# Patient Record
Sex: MALE | Race: WHITE | NOT HISPANIC OR LATINO | Employment: OTHER | URBAN - METROPOLITAN AREA
[De-identification: names, ages, dates, MRNs, and addresses within clinical notes are randomized per-mention and may not be internally consistent; named-entity substitution may affect disease eponyms.]

---

## 2023-05-16 ENCOUNTER — OFFICE VISIT (OUTPATIENT)
Dept: FAMILY MEDICINE CLINIC | Facility: CLINIC | Age: 83
End: 2023-05-16

## 2023-05-16 VITALS
HEIGHT: 67 IN | TEMPERATURE: 98.3 F | DIASTOLIC BLOOD PRESSURE: 90 MMHG | SYSTOLIC BLOOD PRESSURE: 180 MMHG | WEIGHT: 189 LBS | BODY MASS INDEX: 29.66 KG/M2 | OXYGEN SATURATION: 98 % | HEART RATE: 72 BPM

## 2023-05-16 DIAGNOSIS — M10.9 GOUT, UNSPECIFIED CAUSE, UNSPECIFIED CHRONICITY, UNSPECIFIED SITE: ICD-10-CM

## 2023-05-16 DIAGNOSIS — R79.89 ELEVATED TSH: ICD-10-CM

## 2023-05-16 DIAGNOSIS — I10 PRIMARY HYPERTENSION: Primary | ICD-10-CM

## 2023-05-16 DIAGNOSIS — Z13.220 SCREENING FOR LIPID DISORDERS: ICD-10-CM

## 2023-05-16 DIAGNOSIS — Z00.00 MEDICARE ANNUAL WELLNESS VISIT, INITIAL: ICD-10-CM

## 2023-05-16 DIAGNOSIS — M25.471 RIGHT ANKLE SWELLING: ICD-10-CM

## 2023-05-16 DIAGNOSIS — N18.31 STAGE 3A CHRONIC KIDNEY DISEASE (HCC): ICD-10-CM

## 2023-05-16 RX ORDER — LANOLIN ALCOHOL/MO/W.PET/CERES
CREAM (GRAM) TOPICAL DAILY
COMMUNITY

## 2023-05-16 RX ORDER — CHOLECALCIFEROL (VITAMIN D3) 1250 MCG
CAPSULE ORAL
COMMUNITY

## 2023-05-16 RX ORDER — BIMATOPROST 0.3 MG/ML
SOLUTION/ DROPS OPHTHALMIC
COMMUNITY
Start: 2023-04-11

## 2023-05-16 NOTE — PATIENT INSTRUCTIONS
Medicare Preventive Visit Patient Instructions  Thank you for completing your Welcome to Medicare Visit or Medicare Annual Wellness Visit today  Your next wellness visit will be due in one year (5/16/2024)  The screening/preventive services that you may require over the next 5-10 years are detailed below  Some tests may not apply to you based off risk factors and/or age  Screening tests ordered at today's visit but not completed yet may show as past due  Also, please note that scanned in results may not display below  Preventive Screenings:  Service Recommendations Previous Testing/Comments   Colorectal Cancer Screening  · Colonoscopy    · Fecal Occult Blood Test (FOBT)/Fecal Immunochemical Test (FIT)  · Fecal DNA/Cologuard Test  · Flexible Sigmoidoscopy Age: 39-70 years old   Colonoscopy: every 10 years (May be performed more frequently if at higher risk)  OR  FOBT/FIT: every 1 year  OR  Cologuard: every 3 years  OR  Sigmoidoscopy: every 5 years  Screening may be recommended earlier than age 39 if at higher risk for colorectal cancer  Also, an individualized decision between you and your healthcare provider will decide whether screening between the ages of 74-80 would be appropriate   Colonoscopy: Not on file  FOBT/FIT: Not on file  Cologuard: Not on file  Sigmoidoscopy: Not on file          Prostate Cancer Screening Individualized decision between patient and health care provider in men between ages of 53-78   Medicare will cover every 12 months beginning on the day after your 50th birthday PSA: No results in last 5 years     Screening Not Indicated     Hepatitis C Screening Once for adults born between [facility]  More frequently in patients at high risk for Hepatitis C Hep C Antibody: Not on file        Diabetes Screening 1-2 times per year if you're at risk for diabetes or have pre-diabetes Fasting glucose: No results in last 5 years (No results in last 5 years)  A1C: 5 4 % (6/30/2018)      Cholesterol Screening Once every 5 years if you don't have a lipid disorder  May order more often based on risk factors  Lipid panel: 07/10/2021  Screening Current      Other Preventive Screenings Covered by Medicare:  1  Abdominal Aortic Aneurysm (AAA) Screening: covered once if your at risk  You're considered to be at risk if you have a family history of AAA or a male between the age of 73-68 who smoking at least 100 cigarettes in your lifetime  2  Lung Cancer Screening: covers low dose CT scan once per year if you meet all of the following conditions: (1) Age 50-69; (2) No signs or symptoms of lung cancer; (3) Current smoker or have quit smoking within the last 15 years; (4) You have a tobacco smoking history of at least 20 pack years (packs per day x number of years you smoked); (5) You get a written order from a healthcare provider  3  Glaucoma Screening: covered annually if you're considered high risk: (1) You have diabetes OR (2) Family history of glaucoma OR (3)  aged 48 and older OR (3)  American aged 72 and older  3  Osteoporosis Screening: covered every 2 years if you meet one of the following conditions: (1) Have a vertebral abnormality; (2) On glucocorticoid therapy for more than 3 months; (3) Have primary hyperparathyroidism; (4) On osteoporosis medications and need to assess response to drug therapy  5  HIV Screening: covered annually if you're between the age of 12-76  Also covered annually if you are younger than 13 and older than 72 with risk factors for HIV infection  For pregnant patients, it is covered up to 3 times per pregnancy      Immunizations:  Immunization Recommendations   Influenza Vaccine Annual influenza vaccination during flu season is recommended for all persons aged >= 6 months who do not have contraindications   Pneumococcal Vaccine   * Pneumococcal conjugate vaccine = PCV13 (Prevnar 13), PCV15 (Vaxneuvance), PCV20 (Prevnar 20)  * Pneumococcal polysaccharide vaccine = PPSV23 (Pneumovax) Adults 2364 years old: 1-3 doses may be recommended based on certain risk factors  Adults 72 years old: 1-2 doses may be recommended based off what pneumonia vaccine you previously received   Hepatitis B Vaccine 3 dose series if at intermediate or high risk (ex: diabetes, end stage renal disease, liver disease)   Tetanus (Td) Vaccine - COST NOT COVERED BY MEDICARE PART B Following completion of primary series, a booster dose should be given every 10 years to maintain immunity against tetanus  Td may also be given as tetanus wound prophylaxis  Tdap Vaccine - COST NOT COVERED BY MEDICARE PART B Recommended at least once for all adults  For pregnant patients, recommended with each pregnancy  Shingles Vaccine (Shingrix) - COST NOT COVERED BY MEDICARE PART B  2 shot series recommended in those aged 48 and above     Health Maintenance Due:  There are no preventive care reminders to display for this patient  Immunizations Due:      Topic Date Due   • COVID-19 Vaccine (1) Never done   • Pneumococcal Vaccine: 65+ Years (1 - PCV) Never done     Advance Directives   What are advance directives? Advance directives are legal documents that state your wishes and plans for medical care  These plans are made ahead of time in case you lose your ability to make decisions for yourself  Advance directives can apply to any medical decision, such as the treatments you want, and if you want to donate organs  What are the types of advance directives? There are many types of advance directives, and each state has rules about how to use them  You may choose a combination of any of the following:  · Living will: This is a written record of the treatment you want  You can also choose which treatments you do not want, which to limit, and which to stop at a certain time  This includes surgery, medicine, IV fluid, and tube feedings  · Durable power of  for healthcare [facility]):   This is a written record that states who you want to make healthcare choices for you when you are unable to make them for yourself  This person, called a proxy, is usually a family member or a friend  You may choose more than 1 proxy  · Do not resuscitate (DNR) order:  A DNR order is used in case your heart stops beating or you stop breathing  It is a request not to have certain forms of treatment, such as CPR  A DNR order may be included in other types of advance directives  · Medical directive: This covers the care that you want if you are in a coma, near death, or unable to make decisions for yourself  You can list the treatments you want for each condition  Treatment may include pain medicine, surgery, blood transfusions, dialysis, IV or tube feedings, and a ventilator (breathing machine)  · Values history: This document has questions about your views, beliefs, and how you feel and think about life  This information can help others choose the care that you would choose  Why are advance directives important? An advance directive helps you control your care  Although spoken wishes may be used, it is better to have your wishes written down  Spoken wishes can be misunderstood, or not followed  Treatments may be given even if you do not want them  An advance directive may make it easier for your family to make difficult choices about your care  Weight Management   Why it is important to manage your weight:  Being overweight increases your risk of health conditions such as heart disease, high blood pressure, type 2 diabetes, and certain types of cancer  It can also increase your risk for osteoarthritis, sleep apnea, and other respiratory problems  Aim for a slow, steady weight loss  Even a small amount of weight loss can lower your risk of health problems  How to lose weight safely:  A safe and healthy way to lose weight is to eat fewer calories and get regular exercise   You can lose up about 1 pound a week by decreasing the number of calories you eat by 500 calories each day  Healthy meal plan for weight management:  A healthy meal plan includes a variety of foods, contains fewer calories, and helps you stay healthy  A healthy meal plan includes the following:  · Eat whole-grain foods more often  A healthy meal plan should contain fiber  Fiber is the part of grains, fruits, and vegetables that is not broken down by your body  Whole-grain foods are healthy and provide extra fiber in your diet  Some examples of whole-grain foods are whole-wheat breads and pastas, oatmeal, brown rice, and bulgur  · Eat a variety of vegetables every day  Include dark, leafy greens such as spinach, kale, aaliyah greens, and mustard greens  Eat yellow and orange vegetables such as carrots, sweet potatoes, and winter squash  · Eat a variety of fruits every day  Choose fresh or canned fruit (canned in its own juice or light syrup) instead of juice  Fruit juice has very little or no fiber  · Eat low-fat dairy foods  Drink fat-free (skim) milk or 1% milk  Eat fat-free yogurt and low-fat cottage cheese  Try low-fat cheeses such as mozzarella and other reduced-fat cheeses  · Choose meat and other protein foods that are low in fat  Choose beans or other legumes such as split peas or lentils  Choose fish, skinless poultry (chicken or turkey), or lean cuts of red meat (beef or pork)  Before you cook meat or poultry, cut off any visible fat  · Use less fat and oil  Try baking foods instead of frying them  Add less fat, such as margarine, sour cream, regular salad dressing and mayonnaise to foods  Eat fewer high-fat foods  Some examples of high-fat foods include french fries, doughnuts, ice cream, and cakes  · Eat fewer sweets  Limit foods and drinks that are high in sugar  This includes candy, cookies, regular soda, and sweetened drinks  Exercise:  Exercise at least 30 minutes per day on most days of the week   Some examples of exercise include walking, biking, dancing, and swimming  You can also fit in more physical activity by taking the stairs instead of the elevator or parking farther away from stores  Ask your healthcare provider about the best exercise plan for you  © Copyright [employer] 2018 Information is for End User's use only and may not be sold, redistributed or otherwise used for commercial purposes   All illustrations and images included in CareNotes® are the copyrighted property of [employer]  or [address] [place], [place] [address]pe

## 2023-05-16 NOTE — PROGRESS NOTES
Assessment and Plan:     Problem List Items Addressed This Visit    None  Visit Diagnoses     Primary hypertension    -  Primary    Stage 3a chronic kidney disease ([facility] [address] )        Relevant Orders    Comprehensive metabolic panel    CBC and differential    Elevated TSH        Relevant Orders    TSH, 3rd generation with Free T4 reflex    Screening for lipid disorders        Relevant Orders    Lipid Panel with Direct LDL reflex    Gout, unspecified cause, unspecified chronicity, unspecified site        Relevant Orders    Uric acid    Right ankle swelling        Relevant Orders    US extremity soft tissue    Medicare annual wellness visit, initial          hx reviewed and updated  After discussion of risks/benefits pt agreeable to beginning amlodipine 5mg  Check BP at home and return to office for recheck in 2 weeks  Labs ordered as above  Avoid nsaids in CKD  Stay hydrated  US cystic swelling R ankle  Check TSH as was previously elevated  Check uric acid  Preventive health issues were discussed with patient, and age appropriate screening tests were ordered as noted in patient's After Visit Summary  Personalized health advice and appropriate referrals for health education or preventive services given if needed, as noted in patient's After Visit Summary  History of Present Illness:     Patient presents for a Medicare Wellness Visit    Pt presents to establish care  Was recently seeing an independent physician  He takes no daily medications  He has a hx of gout, otherwise no signficant PMH  Non smoker  No abuse/misuse of alcohol  No allergies  He takes a variety of supplements  Chart review reveals CKD3a, elevated TSH    He has no current sx related to gout    BP 180s/90s  Never on antihypertensives       He does note painless swelling of the right lateral ankle     Patient Care Team:  Larry Kothari PA-C as PCP - General (Family Medicine)     Review of Systems:     Review of Systems   Constitutional: Negative for chills, fatigue and fever  HENT: Negative for congestion, ear pain, hearing loss, nosebleeds, postnasal drip, rhinorrhea, sinus pressure, sinus pain, sneezing and sore throat  Eyes: Negative for pain, discharge, itching and visual disturbance  Respiratory: Negative for cough, chest tightness, shortness of breath and wheezing  Cardiovascular: Negative for chest pain, palpitations and leg swelling  Gastrointestinal: Negative for abdominal pain, blood in stool, constipation, diarrhea, nausea and vomiting  Genitourinary: Negative for frequency and urgency  Musculoskeletal: Negative  Skin: Negative  Neurological: Negative for dizziness, light-headedness and numbness  Psychiatric/Behavioral: Negative  Problem List:     There is no problem list on file for this patient  Past Medical and Surgical History:     Past Medical History:   Diagnosis Date   • Kidney stone      History reviewed  No pertinent surgical history  Family History:     Family History   Problem Relation Age of Onset   • Hypertension Mother    • Hypertension Father    • Parkinsonism Father       Social History:     Social History     Socioeconomic History   • Marital status:      Spouse name: None   • Number of children: None   • Years of education: None   • Highest education level: None   Occupational History   • None   Tobacco Use   • Smoking status: Never     Passive exposure: Never   • Smokeless tobacco: Never   Substance and Sexual Activity   • Alcohol use: None   • Drug use: None   • Sexual activity: None   Other Topics Concern   • None   Social History Narrative   • None     Social Determinants of Health     Financial Resource Strain: Low Risk    • Difficulty of Paying Living Expenses: Not hard at all   Food Insecurity: Not on file   Transportation Needs: No Transportation Needs   • Lack of Transportation (Medical): No   • Lack of Transportation (Non-Medical):  No   Physical Activity: Not on file Stress: Not on file   Social Connections: Not on file   Intimate Partner Violence: Not on file   Housing Stability: Not on file      Medications and Allergies:     Current Outpatient Medications   Medication Sig Dispense Refill   • Cholecalciferol (Vitamin D3) 1 25 MG (10877 UT) CAPS Take by mouth     • Chromium Picolinate (CHROMIUM PICOLATE PO) Take by mouth     • CINNAMON PO Take by mouth     • Coenzyme Q10 (COQ10 PO) Take by mouth     • Emollient (DHEA EX) Apply topically     • GARLIC 9375 PO Take by mouth     • vitamin B-12 (VITAMIN B-12) 1,000 mcg tablet Take by mouth daily     • Zinc Sulfate (ZINC-220 PO) Take by mouth     • bimatoprost (LUMIGAN) 0 03 % ophthalmic drops        No current facility-administered medications for this visit  No Known Allergies   Immunizations: There is no immunization history on file for this patient  Health Maintenance: There are no preventive care reminders to display for this patient  Topic Date Due   • COVID-19 Vaccine (1) Never done   • Pneumococcal Vaccine: 65+ Years (1 - PCV) Never done      Medicare Screening Tests and Risk Assessments:     Cornel Garcia is here for his Initial Wellness visit  Health Risk Assessment:   Patient rates overall health as excellent  Patient feels that their physical health rating is same  Patient is very satisfied with their life  Eyesight was rated as same  Hearing was rated as same  Patient feels that their emotional and mental health rating is same  Patients states they are never, rarely angry  Patient states they are never, rarely unusually tired/fatigued  Pain experienced in the last 7 days has been none  Patient states that he has experienced no weight loss or gain in last 6 months  Depression Screening:   PHQ-2 Score: 0      Fall Risk Screening:    In the past year, patient has experienced: no history of falling in past year      Home Safety:  Patient does not have trouble with stairs inside or outside of their home  Patient has working smoke alarms and has no working carbon monoxide detector  Home safety hazards include: none  Nutrition:   Current diet is Regular  BMI Counseling: @BMI@ The BMI is above normal  Nutrition recommendations include 3-5 servings of fruits/vegetables daily, consuming healthier snacks and moderation in carbohydrate intake  Exercise recommendations include strength training exercises  Medications:   Patient is currently taking over-the-counter supplements  OTC medications include: see medication list  Patient is able to manage medications  Activities of Daily Living (ADLs)/Instrumental Activities of Daily Living (IADLs):   Walk and transfer into and out of bed and chair?: Yes  Dress and groom yourself?: Yes    Bathe or shower yourself?: Yes    Feed yourself? Yes  Do your laundry/housekeeping?: Yes  Manage your money, pay your bills and track your expenses?: Yes  Make your own meals?: Yes    Do your own shopping?: Yes    Previous Hospitalizations:   Any hospitalizations or ED visits within the last 12 months?: No      Advance Care Planning:   Living will: Yes    Durable POA for healthcare:  Yes    Advanced directive: Yes    Advanced directive counseling given: No    Five wishes given: No      PREVENTIVE SCREENINGS      Cardiovascular Screening:    General: Screening Current      Diabetes Screening:     General: Risks and Benefits Discussed    Due for: Blood Glucose      Colorectal Cancer Screening:     General: Risks and Benefits Discussed and Screening Not Indicated      Prostate Cancer Screening:    General: Screening Not Indicated      Osteoporosis Screening:    General: Screening Not Indicated      Abdominal Aortic Aneurysm (AAA) Screening:        General: Screening Not Indicated      Lung Cancer Screening:     General: Screening Not Indicated      Hepatitis C Screening:    General: Screening Not Indicated    Screening, Brief Intervention, and Referral to Treatment "(SBIRT)    Screening  Typical number of drinks in a day: 2  Typical number of drinks in a week: 14  Interpretation: Low risk drinking behavior  Single Item Drug Screening:  How often have you used an illegal drug (including marijuana) or a prescription medication for non-medical reasons in the past year? never    Single Item Drug Screen Score: 0  Interpretation: Negative screen for possible drug use disorder    No results found  Physical Exam:     BP (!) 180/90   Pulse 72   Temp 98 3 °F (36 8 °C)   Ht 5' 7\" (1 702 m)   Wt 85 7 kg (189 lb)   SpO2 98%   BMI 29 60 kg/m²     Physical Exam  Vitals and nursing note reviewed  Constitutional:       General: He is not in acute distress  Appearance: He is well-developed  HENT:      Head: Normocephalic and atraumatic  Right Ear: Tympanic membrane, ear canal and external ear normal       Left Ear: Tympanic membrane, ear canal and external ear normal       Nose: Nose normal    Eyes:      Conjunctiva/sclera: Conjunctivae normal    Cardiovascular:      Rate and Rhythm: Normal rate and regular rhythm  Heart sounds: Murmur (fine murmur, systolic, apparently chronic, no cardiac sx) heard  Pulmonary:      Effort: Pulmonary effort is normal  No respiratory distress  Breath sounds: Normal breath sounds  No wheezing, rhonchi or rales  Abdominal:      Palpations: Abdomen is soft  Tenderness: There is no abdominal tenderness  Musculoskeletal:         General: No swelling  Cervical back: Neck supple  Right lower leg: No edema  Left lower leg: No edema  Legs:    Skin:     General: Skin is warm and dry  Capillary Refill: Capillary refill takes less than 2 seconds  Neurological:      Mental Status: He is alert and oriented to person, place, and time     Psychiatric:         Mood and Affect: Mood normal           Jake Stephenson PA-C  "

## 2023-05-18 ENCOUNTER — TELEPHONE (OUTPATIENT)
Dept: FAMILY MEDICINE CLINIC | Facility: CLINIC | Age: 83
End: 2023-05-18

## 2023-05-18 DIAGNOSIS — I10 PRIMARY HYPERTENSION: Primary | ICD-10-CM

## 2023-05-18 RX ORDER — AMLODIPINE BESYLATE 5 MG/1
5 TABLET ORAL DAILY
Qty: 30 TABLET | Refills: 0 | Status: SHIPPED | OUTPATIENT
Start: 2023-05-18

## 2023-05-18 NOTE — TELEPHONE ENCOUNTER
Pt stopped in to request you order his BP meds  ?? He didn't know what medication you were calling into his pharmacy  I didn't see anything on medication list (he was a new patient 5/16) He lives in [facility] and will stop at pharmacy tomorrow morning

## 2023-05-23 ENCOUNTER — APPOINTMENT (OUTPATIENT)
Dept: LAB | Facility: CLINIC | Age: 83
End: 2023-05-23

## 2023-05-23 DIAGNOSIS — N18.31 STAGE 3A CHRONIC KIDNEY DISEASE (HCC): ICD-10-CM

## 2023-05-23 DIAGNOSIS — R79.89 ELEVATED TSH: ICD-10-CM

## 2023-05-23 DIAGNOSIS — M10.9 GOUT, UNSPECIFIED CAUSE, UNSPECIFIED CHRONICITY, UNSPECIFIED SITE: ICD-10-CM

## 2023-05-23 DIAGNOSIS — Z13.220 SCREENING FOR LIPID DISORDERS: ICD-10-CM

## 2023-05-23 LAB
ALBUMIN SERPL BCP-MCNC: 3.9 G/DL (ref 3.5–5)
ALP SERPL-CCNC: 72 U/L (ref 46–116)
ALT SERPL W P-5'-P-CCNC: 31 U/L (ref 12–78)
ANION GAP SERPL CALCULATED.3IONS-SCNC: 2 MMOL/L (ref 4–13)
AST SERPL W P-5'-P-CCNC: 25 U/L (ref 5–45)
BASOPHILS # BLD AUTO: 0.06 THOUSANDS/ÂΜL (ref 0–0.1)
BASOPHILS NFR BLD AUTO: 1 % (ref 0–1)
BILIRUB SERPL-MCNC: 1.24 MG/DL (ref 0.2–1)
BUN SERPL-MCNC: 21 MG/DL (ref 5–25)
CALCIUM SERPL-MCNC: 9.4 MG/DL (ref 8.3–10.1)
CHLORIDE SERPL-SCNC: 104 MMOL/L (ref 96–108)
CHOLEST SERPL-MCNC: 222 MG/DL
CO2 SERPL-SCNC: 26 MMOL/L (ref 21–32)
CREAT SERPL-MCNC: 1.5 MG/DL (ref 0.6–1.3)
EOSINOPHIL # BLD AUTO: 0.3 THOUSAND/ÂΜL (ref 0–0.61)
EOSINOPHIL NFR BLD AUTO: 6 % (ref 0–6)
ERYTHROCYTE [DISTWIDTH] IN BLOOD BY AUTOMATED COUNT: 12.3 % (ref 11.6–15.1)
GFR SERPL CREATININE-BSD FRML MDRD: 42 ML/MIN/1.73SQ M
GLUCOSE P FAST SERPL-MCNC: 101 MG/DL (ref 65–99)
HCT VFR BLD AUTO: 41.2 % (ref 36.5–49.3)
HDLC SERPL-MCNC: 70 MG/DL
HGB BLD-MCNC: 13.8 G/DL (ref 12–17)
IMM GRANULOCYTES # BLD AUTO: 0.01 THOUSAND/UL (ref 0–0.2)
IMM GRANULOCYTES NFR BLD AUTO: 0 % (ref 0–2)
LDLC SERPL CALC-MCNC: 133 MG/DL (ref 0–100)
LYMPHOCYTES # BLD AUTO: 1.49 THOUSANDS/ÂΜL (ref 0.6–4.47)
LYMPHOCYTES NFR BLD AUTO: 29 % (ref 14–44)
MCH RBC QN AUTO: 30.4 PG (ref 26.8–34.3)
MCHC RBC AUTO-ENTMCNC: 33.5 G/DL (ref 31.4–37.4)
MCV RBC AUTO: 91 FL (ref 82–98)
MONOCYTES # BLD AUTO: 0.52 THOUSAND/ÂΜL (ref 0.17–1.22)
MONOCYTES NFR BLD AUTO: 10 % (ref 4–12)
NEUTROPHILS # BLD AUTO: 2.73 THOUSANDS/ÂΜL (ref 1.85–7.62)
NEUTS SEG NFR BLD AUTO: 54 % (ref 43–75)
NRBC BLD AUTO-RTO: 0 /100 WBCS
PLATELET # BLD AUTO: 207 THOUSANDS/UL (ref 149–390)
PMV BLD AUTO: 10.2 FL (ref 8.9–12.7)
POTASSIUM SERPL-SCNC: 4 MMOL/L (ref 3.5–5.3)
PROT SERPL-MCNC: 8 G/DL (ref 6.4–8.4)
RBC # BLD AUTO: 4.54 MILLION/UL (ref 3.88–5.62)
SODIUM SERPL-SCNC: 132 MMOL/L (ref 135–147)
TRIGL SERPL-MCNC: 96 MG/DL
URATE SERPL-MCNC: 8.3 MG/DL (ref 3.5–8.5)
WBC # BLD AUTO: 5.11 THOUSAND/UL (ref 4.31–10.16)

## 2023-05-24 ENCOUNTER — TELEPHONE (OUTPATIENT)
Dept: NEPHROLOGY | Facility: CLINIC | Age: 83
End: 2023-05-24

## 2023-05-24 DIAGNOSIS — N18.31 STAGE 3A CHRONIC KIDNEY DISEASE (HCC): Primary | ICD-10-CM

## 2023-05-24 LAB
T4 FREE SERPL-MCNC: 0.9 NG/DL (ref 0.61–1.12)
TSH SERPL DL<=0.05 MIU/L-ACNC: 6.43 UIU/ML (ref 0.45–4.5)

## 2023-05-24 NOTE — TELEPHONE ENCOUNTER
I called and left message on patient asnwering machine for patient to give the office a call back about scheduling a Nephrology Consut ref by dr Maureen Quintero [facility]

## 2023-05-25 ENCOUNTER — TELEPHONE (OUTPATIENT)
Dept: NEPHROLOGY | Facility: CLINIC | Age: 83
End: 2023-05-25

## 2023-05-25 NOTE — TELEPHONE ENCOUNTER
I called and left message on patient asnwering machine for patient to give the office a call back about scheduling a Nephrology Consut ref by dr Mynor Francisco for Stage [facility]

## 2023-05-26 ENCOUNTER — TELEPHONE (OUTPATIENT)
Dept: NEPHROLOGY | Facility: CLINIC | Age: 83
End: 2023-05-26

## 2023-05-26 ENCOUNTER — RA CDI HCC (OUTPATIENT)
Dept: OTHER | Facility: HOSPITAL | Age: 83
End: 2023-05-26

## 2023-05-26 NOTE — TELEPHONE ENCOUNTER
Good Morning,    Hi, patient has an appointment with Dr Wilber Leal on Tuesday 05/30/2023 @ 11:00 can you please enter a set of blood work in chart  Patient will be going today to have blood work done   Nephrology Consult ref by Phoenix Shabazz for Nephrology Consut ref by dr Tony Vega for Stage 3a chronic kidney disease [facility])    Thank you

## 2023-05-26 NOTE — PROGRESS NOTES
[facility]  coding opportunities       Chart reviewed, no opportunity found:   [address]        Patients Insurance     Medicare Insurance: [employer] Advantage

## 2023-05-26 NOTE — TELEPHONE ENCOUNTER
Called [facility] to advised patient he does not need any further labs done prior to Consult appt with Dr Jayant TRENT: Patient had labs done on 5/23/23 Dr Saba reviewed patient's chart and advised no further labs  okay for consult visit

## 2023-05-30 ENCOUNTER — CONSULT (OUTPATIENT)
Dept: NEPHROLOGY | Facility: CLINIC | Age: 83
End: 2023-05-30

## 2023-05-30 VITALS
BODY MASS INDEX: 29.19 KG/M2 | OXYGEN SATURATION: 100 % | HEART RATE: 63 BPM | DIASTOLIC BLOOD PRESSURE: 70 MMHG | TEMPERATURE: 97.3 F | HEIGHT: 67 IN | RESPIRATION RATE: 16 BRPM | SYSTOLIC BLOOD PRESSURE: 180 MMHG | WEIGHT: 186 LBS

## 2023-05-30 DIAGNOSIS — N18.31 STAGE 3A CHRONIC KIDNEY DISEASE (HCC): ICD-10-CM

## 2023-05-30 DIAGNOSIS — I10 PRIMARY HYPERTENSION: Primary | ICD-10-CM

## 2023-05-30 RX ORDER — AMLODIPINE BESYLATE 5 MG/1
10 TABLET ORAL DAILY
Qty: 180 TABLET | Refills: 3 | Status: SHIPPED | OUTPATIENT
Start: 2023-05-30

## 2023-05-30 NOTE — PROGRESS NOTES
NEPHROLOGY OFFICE CONSULT  Bonnie Tracey 79 y o  male MRN: [id number]    Encounter: [id number] DATE: 5/30/2023    REASON FOR VISIT: Bonnie Tracey is a 79 y o male who was referred by Markie Guan for evaluation  Gary Hanson HPI:    This is a 79years old male with past medical history of daily alcohol consumption, hypertension, chronic kidney disease stage IIIb, chronic NSAID use who was referred to renal office by his PCP due to elevated renal parameters  Upon review of care everywhere, patient has elevated creatinine in stage III CKD range as early as the year 2018  Patient had admitted to use of 3 glasses of wine daily until few weeks ago  He also admitted to be using ibuprofen occasionally in his lifetime  Denies drinking more than 1 L of water daily  He admits to be having borderline high blood pressure when been seen by his prior PCP but was never initiated on blood pressure medications  Recently notes systolic blood pressure exceeding 631 mmHg systolic and elevated  He was therefore started on amlodipine 5 mg daily  Patient denies checking blood pressure at home  PAST MEDICAL HISTORY:  Past Medical History:   Diagnosis Date   • Kidney stone        PAST SURGICAL HISTORY:  History reviewed  No pertinent surgical history      SOCIAL HISTORY:  Social History     Substance and Sexual Activity   Alcohol Use None     Social History     Substance and Sexual Activity   Drug Use Not on file     Social History     Tobacco Use   Smoking Status Never   • Passive exposure: Never   Smokeless Tobacco Never       FAMILY HISTORY:  Family History   Problem Relation Age of Onset   • Hypertension Mother    • Hypertension Father    • Parkinsonism Father        ALLERGY:  No Known Allergies    MEDICATIONS:    Current Outpatient Medications:   •  amLODIPine (NORVASC) 5 mg tablet, Take 2 tablets (10 mg total) by mouth daily, Disp: 180 tablet, Rfl: 3  •  bimatoprost (LUMIGAN) 0 03 % ophthalmic drops, , Disp: , Rfl:   • "Cholecalciferol (Vitamin D3) 1 25 MG (22326 UT) CAPS, Take by mouth, Disp: , Rfl:   •  Chromium Picolinate (CHROMIUM PICOLATE PO), Take by mouth, Disp: , Rfl:   •  CINNAMON PO, Take by mouth, Disp: , Rfl:   •  Coenzyme Q10 (COQ10 PO), Take by mouth, Disp: , Rfl:   •  GARLIC 8051 PO, Take by mouth, Disp: , Rfl:   •  vitamin B-12 (VITAMIN B-12) 1,000 mcg tablet, Take by mouth daily, Disp: , Rfl:   •  Zinc Sulfate (ZINC-220 PO), Take by mouth, Disp: , Rfl:   •  Emollient (DHEA EX), Apply topically (Patient not taking: Reported on 5/30/2023), Disp: , Rfl:     REVIEW OF SYSTEMS:    Review of Systems   Constitutional: Negative  HENT: Negative  Eyes: Negative  Respiratory: Negative  Cardiovascular: Negative  Gastrointestinal: Negative  Endocrine: Negative  Genitourinary: Negative  Musculoskeletal: Negative  Skin: Negative  Allergic/Immunologic: Negative  Neurological: Negative  Hematological: Negative  All other systems reviewed and are negative  PHYSICAL EXAM:  Vitals:    05/30/23 1050   BP: (!) 180/70   BP Location: Left arm   Patient Position: Sitting   Cuff Size: Standard   Pulse: 63   Resp: 16   Temp: (!) 97 3 °F (36 3 °C)   TempSrc: Temporal   SpO2: 100%   Weight: 84 4 kg (186 lb)   Height: 5' 7\" (1 702 m)     Body mass index is 29 13 kg/m²  Physical Exam  HENT:      Head: Normocephalic and atraumatic  Eyes:      Pupils: Pupils are equal, round, and reactive to light  Neck:      Vascular: No JVD  Cardiovascular:      Rate and Rhythm: Normal rate and regular rhythm  Heart sounds: Normal heart sounds  No murmur heard  No friction rub  Pulmonary:      Effort: Pulmonary effort is normal       Breath sounds: Normal breath sounds  Abdominal:      General: Bowel sounds are normal  There is no distension  Palpations: Abdomen is soft  Tenderness: There is no abdominal tenderness  There is no rebound  Musculoskeletal:         General: No tenderness   " Cervical back: Neck supple  Skin:     General: Skin is dry  Findings: No rash  Neurological:      Mental Status: He is alert and oriented to person, place, and time           LAB RESULTS:  Results for orders placed or performed in visit on 05/23/23   Comprehensive metabolic panel   Result Value Ref Range    Sodium 132 (L) 135 - 147 mmol/L    Potassium 4 0 3 5 - 5 3 mmol/L    Chloride 104 96 - 108 mmol/L    CO2 26 21 - 32 mmol/L    ANION GAP 2 (L) 4 - 13 mmol/L    BUN 21 5 - 25 mg/dL    Creatinine 1 50 (H) 0 60 - 1 30 mg/dL    Glucose, Fasting 101 (H) 65 - 99 mg/dL    Calcium 9 4 8 3 - 10 1 mg/dL    AST 25 5 - 45 U/L    ALT 31 12 - 78 U/L    Alkaline Phosphatase 72 46 - 116 U/L    Total Protein 8 0 6 4 - 8 4 g/dL    Albumin 3 9 3 5 - 5 0 g/dL    Total Bilirubin 1 24 (H) 0 20 - 1 00 mg/dL    eGFR 42 ml/min/1 73sq m   CBC and differential   Result Value Ref Range    WBC 5 11 4 31 - 10 16 Thousand/uL    RBC 4 54 3 88 - 5 62 Million/uL    Hemoglobin 13 8 12 0 - 17 0 g/dL    Hematocrit 41 2 36 5 - 49 3 %    MCV 91 82 - 98 fL    MCH 30 4 26 8 - 34 3 pg    MCHC 33 5 31 4 - 37 4 g/dL    RDW 12 3 11 6 - 15 1 %    MPV 10 2 8 9 - 12 7 fL    Platelets 381 525 - 029 Thousands/uL    nRBC 0 /100 WBCs    Neutrophils Relative 54 43 - 75 %    Immat GRANS % 0 0 - 2 %    Lymphocytes Relative 29 14 - 44 %    Monocytes Relative 10 4 - 12 %    Eosinophils Relative 6 0 - 6 %    Basophils Relative 1 0 - 1 %    Neutrophils Absolute 2 73 1 85 - 7 62 Thousands/µL    Immature Grans Absolute 0 01 0 00 - 0 20 Thousand/uL    Lymphocytes Absolute 1 49 0 60 - 4 47 Thousands/µL    Monocytes Absolute 0 52 0 17 - 1 22 Thousand/µL    Eosinophils Absolute 0 30 0 00 - 0 61 Thousand/µL    Basophils Absolute 0 06 0 00 - 0 10 Thousands/µL   Lipid Panel with Direct LDL reflex   Result Value Ref Range    Cholesterol 222 (H) See Comment mg/dL    Triglycerides 96 See Comment mg/dL    HDL, Direct 70 >=40 mg/dL    LDL Calculated 133 (H) 0 - 100 mg/dL TSH, 3rd generation with Free T4 reflex   Result Value Ref Range    TSH 3RD GENERATON 6 428 (H) 0 450 - 4 500 uIU/mL   Uric acid   Result Value Ref Range    Uric Acid 8 3 3 5 - 8 5 mg/dL   T4, free   Result Value Ref Range    Free T4 0 90 0 61 - 1 12 ng/dL         ASSESSMENT and PLAN:  Danielle Self was seen today for chronic kidney disease and consult  Diagnoses and all orders for this visit:    Primary hypertension  -     amLODIPine (NORVASC) 5 mg tablet; Take 2 tablets (10 mg total) by mouth daily    Stage 3a chronic kidney disease ([facility], [place] [address] )  -     Ambulatory Referral to Nephrology  -      kidney and bladder; Future  -     Immunoglobulin free LT chains blood; Future  -     Protein electrophoresis, serum; Future  -     Albumin; Standing  -     Calcium; Standing  -     CBC and differential; Standing  -     Comprehensive metabolic panel; Standing  -     Protein / creatinine ratio, urine; Standing  -     Phosphorus; Standing  -     PTH, intact; Standing  -     Urinalysis with microscopic; Standing  -     Vitamin D 25 hydroxy; Standing  -     Albumin  -     Calcium  -     CBC and differential  -     Comprehensive metabolic panel  -     Protein / creatinine ratio, urine  -     Phosphorus  -     PTH, intact  -     Urinalysis with microscopic  -     Vitamin D 25 hydroxy      79years old male with past medical history of chronic kidney disease stage IIIb, hypertension, chronic NSAID use who presents to renal office for management of CKD    1  Chronic kidney disease stage IIIb: Etiology likely age-related nephron loss, hypertensive nephrosclerosis and chronic NSAID use  Baseline serum creatinine has ranged between 1 3-1 5 with most latest creatinine obtained in month of May 2023 revealing creatinine of 1 5 mg/dL with estimated GFR of 42  There is slight progression that has occurred over the past 2 years owing to uncontrolled hypertension  Obtain renal ultrasound to evaluate patient's renal parenchyma    We will rule out patient for paraproteinemia by ordering SPEP and serum immunofixation    2  Hypertension due to CKD: Blood pressure is elevated in the office at 682 mmHg systolic  Currently patient is taking amlodipine 5 mg daily  We will increase amlodipine to 10 mg daily  Recommended checking blood pressure daily  Recommended low-salt diet  3   Proteinuria: No prior urinalysis has been provided  \  Obtain urinalysis and urine protein creatinine ratio prior to next visit    4  Secondary hyperparathyroidism of renal region: Obtain PTH intact, 25-hydroxy vitamin D levels    5  Alcohol consumption: Given hypertension is a consequence of chronic alcohol intake, recommended to cut back or stop alcohol intake  6   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

## 2023-06-01 ENCOUNTER — OFFICE VISIT (OUTPATIENT)
Dept: FAMILY MEDICINE CLINIC | Facility: CLINIC | Age: 83
End: 2023-06-01

## 2023-06-01 VITALS
DIASTOLIC BLOOD PRESSURE: 72 MMHG | OXYGEN SATURATION: 98 % | WEIGHT: 185.4 LBS | SYSTOLIC BLOOD PRESSURE: 162 MMHG | HEIGHT: 67 IN | HEART RATE: 69 BPM | BODY MASS INDEX: 29.1 KG/M2 | TEMPERATURE: 97.8 F

## 2023-06-01 DIAGNOSIS — R79.89 ELEVATED TSH: ICD-10-CM

## 2023-06-01 DIAGNOSIS — N18.31 STAGE 3A CHRONIC KIDNEY DISEASE (HCC): Primary | ICD-10-CM

## 2023-06-01 DIAGNOSIS — I10 PRIMARY HYPERTENSION: ICD-10-CM

## 2023-06-01 NOTE — PROGRESS NOTES
Name: Madhuri Peañ      :       MRN: [id number]  Encounter Provider: Nikolai Waters PA-C  Encounter Date: 2023   Encounter department: [address] [place] [address]     1  Stage 3a chronic kidney disease ([facility] [address] )    2  Primary hypertension    3  Elevated TSH  -     TSH, 3rd generation with Free T4 reflex; Future    agree with increasing amlodipine to 10mg  Complete workup ordered by nephrology, continue following  Recheck TSH in 4-6 weeks  1 month follow up  Ambulatory BP monitoring encouraged, goal <140/90       Subjective     Pt presents for BP check  Started on amlodipine 5mg  Not checking home BP  Today is imporved but still above goal  CKD3a on labs  Established with nephrology  Has workup ordered  Amlodipine was increased to 10mg via nephrology though pt did not start this yet  Also noted elevated TSH, normal FT4  No overt related sx  Never on thyroid medication  Review of Systems   Constitutional: Negative for chills, fatigue and fever  HENT: Negative for congestion, ear pain, hearing loss, nosebleeds, postnasal drip, rhinorrhea, sinus pressure, sinus pain, sneezing and sore throat  Eyes: Negative for pain, discharge, itching and visual disturbance  Respiratory: Negative for cough, chest tightness, shortness of breath and wheezing  Cardiovascular: Negative for chest pain, palpitations and leg swelling  Gastrointestinal: Negative for abdominal pain, blood in stool, constipation, diarrhea, nausea and vomiting  Genitourinary: Negative for frequency and urgency  Neurological: Negative for dizziness, light-headedness and numbness  Past Medical History:   Diagnosis Date   • Kidney stone      History reviewed  No pertinent surgical history  Family History   Problem Relation Age of Onset   • Hypertension Mother    • Hypertension Father    • Parkinsonism Father      Social History     Socioeconomic History   • Marital status:   "Spouse name: None   • Number of children: None   • Years of education: None   • Highest education level: None   Occupational History   • None   Tobacco Use   • Smoking status: Never     Passive exposure: Never   • Smokeless tobacco: Never   Substance and Sexual Activity   • Alcohol use: None   • Drug use: None   • Sexual activity: None   Other Topics Concern   • None   Social History Narrative   • None     Social Determinants of Health     Financial Resource Strain: Low Risk  (5/16/2023)    Overall Financial Resource Strain (CARDIA)    • Difficulty of Paying Living Expenses: Not hard at all   Food Insecurity: Not on file   Transportation Needs: No Transportation Needs (5/16/2023)    PRAPARE - Transportation    • Lack of Transportation (Medical): No    • Lack of Transportation (Non-Medical): No   Physical Activity: Not on file   Stress: Not on file   Social Connections: Not on file   Intimate Partner Violence: Not on file   Housing Stability: Not on file     Current Outpatient Medications on File Prior to Visit   Medication Sig   • amLODIPine (NORVASC) 5 mg tablet Take 2 tablets (10 mg total) by mouth daily   • bimatoprost (LUMIGAN) 0 03 % ophthalmic drops    • Cholecalciferol (Vitamin D3) 1 25 MG (88235 UT) CAPS Take by mouth   • Chromium Picolinate (CHROMIUM PICOLATE PO) Take by mouth   • CINNAMON PO Take by mouth   • Coenzyme Q10 (COQ10 PO) Take by mouth   • Emollient (DHEA EX) Apply topically (Patient not taking: Reported on 4/86/7878)   • GARLIC 3990 PO Take by mouth   • vitamin B-12 (VITAMIN B-12) 1,000 mcg tablet Take by mouth daily   • Zinc Sulfate (ZINC-220 PO) Take by mouth     No Known Allergies    There is no immunization history on file for this patient  Objective     /72   Pulse 69   Temp 97 8 °F (36 6 °C)   Ht 5' 7\" (1 702 m)   Wt 84 1 kg (185 lb 6 4 oz)   SpO2 98%   BMI 29 04 kg/m²     Physical Exam  Vitals and nursing note reviewed     Constitutional:       General: He is not in acute " distress  Appearance: Normal appearance  HENT:      Head: Normocephalic and atraumatic  Nose: Nose normal    Eyes:      Pupils: Pupils are equal, round, and reactive to light  Cardiovascular:      Rate and Rhythm: Normal rate and regular rhythm  Heart sounds: Normal heart sounds  No murmur heard  Pulmonary:      Effort: Pulmonary effort is normal  No respiratory distress  Breath sounds: Normal breath sounds  No wheezing, rhonchi or rales  Musculoskeletal:         General: Normal range of motion  Cervical back: Normal range of motion and neck supple  Skin:     General: Skin is warm and dry  Neurological:      Mental Status: He is alert and oriented to person, place, and time     Psychiatric:         Mood and Affect: Mood and affect normal        Rosana Soni PA-C

## 2023-06-02 ENCOUNTER — HOSPITAL ENCOUNTER (OUTPATIENT)
Dept: ULTRASOUND IMAGING | Facility: HOSPITAL | Age: 83
End: 2023-06-02
Payer: COMMERCIAL

## 2023-06-02 DIAGNOSIS — M25.471 RIGHT ANKLE SWELLING: ICD-10-CM

## 2023-06-02 PROCEDURE — 76882 US LMTD JT/FCL EVL NVASC XTR: CPT

## 2023-06-07 DIAGNOSIS — M71.379 SYNOVIAL CYST OF ANKLE AND FOOT REGION: Primary | ICD-10-CM

## 2023-06-21 ENCOUNTER — OFFICE VISIT (OUTPATIENT)
Dept: OBGYN CLINIC | Facility: CLINIC | Age: 83
End: 2023-06-21
Payer: COMMERCIAL

## 2023-06-21 VITALS
SYSTOLIC BLOOD PRESSURE: 170 MMHG | DIASTOLIC BLOOD PRESSURE: 62 MMHG | BODY MASS INDEX: 29.03 KG/M2 | HEIGHT: 67 IN | HEART RATE: 64 BPM | WEIGHT: 185 LBS

## 2023-06-21 DIAGNOSIS — M71.379 SYNOVIAL CYST OF ANKLE AND FOOT REGION: ICD-10-CM

## 2023-06-21 PROCEDURE — 99203 OFFICE O/P NEW LOW 30 MIN: CPT | Performed by: FAMILY MEDICINE

## 2023-06-21 PROCEDURE — 20605 DRAIN/INJ JOINT/BURSA W/O US: CPT | Performed by: FAMILY MEDICINE

## 2023-06-21 RX ORDER — BUPIVACAINE HYDROCHLORIDE 2.5 MG/ML
1 INJECTION, SOLUTION INFILTRATION; PERINEURAL
Status: COMPLETED | OUTPATIENT
Start: 2023-06-21 | End: 2023-06-21

## 2023-06-21 RX ORDER — BUPIVACAINE HYDROCHLORIDE 2.5 MG/ML
0.5 INJECTION, SOLUTION INFILTRATION; PERINEURAL
Status: COMPLETED | OUTPATIENT
Start: 2023-06-21 | End: 2023-06-21

## 2023-06-21 RX ORDER — TRIAMCINOLONE ACETONIDE 40 MG/ML
20 INJECTION, SUSPENSION INTRA-ARTICULAR; INTRAMUSCULAR
Status: COMPLETED | OUTPATIENT
Start: 2023-06-21 | End: 2023-06-21

## 2023-06-21 RX ADMIN — TRIAMCINOLONE ACETONIDE 20 MG: 40 INJECTION, SUSPENSION INTRA-ARTICULAR; INTRAMUSCULAR at 11:00

## 2023-06-21 RX ADMIN — BUPIVACAINE HYDROCHLORIDE 0.5 ML: 2.5 INJECTION, SOLUTION INFILTRATION; PERINEURAL at 11:00

## 2023-06-21 RX ADMIN — BUPIVACAINE HYDROCHLORIDE 1 ML: 2.5 INJECTION, SOLUTION INFILTRATION; PERINEURAL at 11:00

## 2023-06-21 NOTE — LETTER
June 21, 2023     Tim Sutherland, [address] [facility]  [address]    Patient: Ana Ward   YOB: 1940   Date of Visit: 6/21/2023       Dear Dr Melina Espana: Thank you for referring Ana Ward to me for evaluation  Below are my notes for this consultation  If you have questions, please do not hesitate to call me  I look forward to following your patient along with you  Sincerely,        LEONARD [employer], DO        CC: No Recipients    LEONARD Mojica, DO  6/21/2023 12:42 PM  Sign when Signing Visit  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  -     Ambulatory Referral to Sports Medicine  -     Medium joint arthrocentesis: R ankle        69-year-old male with right lateral ankle swelling more than 5 months duration  Discussed with patient physical exam, imaging studies, impression, and plan renal ultrasound right ankle noted for complex cystic structure approximately 3 2 x 2 8 x 3 2 cm representing synovial cyst versus ganglion cyst   Physical exam right ankle noted for generalized medial soft tissue swelling with anterolateral well-circumscribed swelling  There is no appreciable bony or soft tissue tenderness of the foot and ankle  He has intact range of motion and strength of the foot and ankle  Passive external rotation and syndesmosis squeeze are unremarkable  He is intact neurovascularly  Clinical impression is that he may have cyst of the ankle  I offered patient therapeutic aspiration to which he agreed  I aspirated 5 cc of clear viscous fluid and administered mixture 0 5 cc 0 25% bupivacaine and 0 5 cc Kenalog without complication  He will follow-up as needed  Subjective:   Patient ID: Ana Ward is a 79 y o  male    Chief Complaint   Patient presents with   • Right Ankle - Swelling       69-year-old male presents for evaluation right lower "ankle swelling more than 5 months duration  He denies trauma or inciting event  He denies any associated pain  He does report having some numbness at the medial aspect of his foot/arch  He denies feeling of instability  Reports that swelling has progressively worsening over the past few months  He was seen by primary care provider and referred for ultrasound which was noted for cystic structure and he was referred to sports medicine  The following portions of the patient's history were reviewed and updated as appropriate: He  has a past medical history of Kidney stone  He has No Known Allergies       Review of Systems   Constitutional: Negative for chills and fever  HENT: Negative for sore throat  Eyes: Negative for visual disturbance  Respiratory: Negative for shortness of breath  Cardiovascular: Negative for chest pain  Gastrointestinal: Negative for abdominal pain  Genitourinary: Negative for flank pain  Musculoskeletal: Positive for joint swelling  Negative for arthralgias  Skin: Negative for rash and wound  Neurological: Positive for numbness  Negative for weakness  Hematological: Does not bruise/bleed easily  Psychiatric/Behavioral: Negative for self-injury  Objective:  Vitals:    06/21/23 1120   BP: 170/62   Pulse: 64   Weight: 83 9 kg (185 lb)   Height: 5' 7\" (1 702 m)     Right Ankle Exam     Muscle Strength   Dorsiflexion:  5/5  Plantar flexion:  5/5    Other   Sensation: normal  Pulse: present           Observations     Right Ankle/Foot   Negative for deformity  Tenderness     Right Ankle/Foot   No tenderness in the Achilles insertion, anterior ankle, anterior talofibular ligament, fifth metatarsal base, calcaneofibular ligament, deltoid ligament, dorsum foot, lateral malleolus, medial malleolus, navicular, peroneal tendon, posterior tibial tendon, posterior talofibular ligament, proximal Achilles and talar dome       Active Range of Motion     Right " Ankle/Foot   Dorsiflexion (kf): WFL  Plantar flexion: WFL  Inversion: WFL  Eversion: WFL    Strength/Myotome Testing     Right Ankle/Foot   Dorsiflexion: 5  Plantar flexion: 5  Inversion: 5  Eversion: 5    Tests     Right Ankle/Foot   Negative for anterior drawer, calcaneal squeeze, metatarsal squeeze, posterior drawer, syndesmosis squeeze and syndesmosis external rotation  Physical Exam  Vitals and nursing note reviewed  Constitutional:       General: He is not in acute distress  Appearance: He is well-developed  He is not ill-appearing or diaphoretic  HENT:      Head: Normocephalic and atraumatic  Right Ear: External ear normal       Left Ear: External ear normal    Eyes:      Conjunctiva/sclera: Conjunctivae normal    Neck:      Trachea: No tracheal deviation  Cardiovascular:      Rate and Rhythm: Normal rate  Pulmonary:      Effort: Pulmonary effort is normal  No respiratory distress  Abdominal:      General: There is no distension  Musculoskeletal:         General: Swelling present  No tenderness, deformity or signs of injury  Right ankle: No lateral malleolus, medial malleolus, ATF ligament, CF ligament, posterior TF ligament or base of 5th metatarsal tenderness  Anterior drawer test negative  Right foot: No deformity  Skin:     General: Skin is warm and dry  Coloration: Skin is not jaundiced or pale  Neurological:      Mental Status: He is alert and oriented to person, place, and time  Psychiatric:         Mood and Affect: Mood normal          Behavior: Behavior normal          Thought Content: Thought content normal          Judgment: Judgment normal            I have personally reviewed pertinent films in PACS and my interpretation is Well-circumscribed fluid collection anterolateral ankle  Medium joint arthrocentesis: R ankle  Universal Protocol:  Consent: Verbal consent obtained    Risks and benefits: risks, benefits and alternatives were "discussed  Consent given by: patient  Time out: Immediately prior to procedure a \"time out\" was called to verify the correct patient, procedure, equipment, support staff and site/side marked as required  Patient understanding: patient states understanding of the procedure being performed  Patient consent: the patient's understanding of the procedure matches consent given  Procedure consent: procedure consent matches procedure scheduled  Relevant documents: relevant documents present and verified  Test results: test results available and properly labeled  Site marked: the operative site was marked  Radiology Images displayed and confirmed   If images not available, report reviewed: imaging studies available  Required items: required blood products, implants, devices, and special equipment available  Patient identity confirmed: verbally with patient    Supporting Documentation  Indications: joint swelling   Procedure Details  Location: ankle - R ankle  Preparation: Patient was prepped and draped in the usual sterile fashion  Needle size: 18 G  Ultrasound guidance: no  Approach: anterolateral  Medications administered: 20 mg triamcinolone acetonide 40 mg/mL; 1 mL bupivacaine 0 25 %; 0 5 mL bupivacaine 0 25 %    Aspirate amount: 5 mL  Aspirate: serous    Patient tolerance: patient tolerated the procedure well with no immediate complications  Dressing:  Sterile dressing applied          "

## 2023-06-21 NOTE — PROGRESS NOTES
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  -     Ambulatory Referral to Sports Medicine  -     Medium joint arthrocentesis: R ankle        59-year-old male with right lateral ankle swelling more than 5 months duration  Discussed with patient physical exam, imaging studies, impression, and plan renal ultrasound right ankle noted for complex cystic structure approximately 3 2 x 2 8 x 3 2 cm representing synovial cyst versus ganglion cyst   Physical exam right ankle noted for generalized medial soft tissue swelling with anterolateral well-circumscribed swelling  There is no appreciable bony or soft tissue tenderness of the foot and ankle  He has intact range of motion and strength of the foot and ankle  Passive external rotation and syndesmosis squeeze are unremarkable  He is intact neurovascularly  Clinical impression is that he may have cyst of the ankle  I offered patient therapeutic aspiration to which he agreed  I aspirated 5 cc of clear viscous fluid and administered mixture 0 5 cc 0 25% bupivacaine and 0 5 cc Kenalog without complication  He will follow-up as needed  Subjective:   Patient ID: Penny Lloyd is a 79 y o  male  Chief Complaint   Patient presents with   • Right Ankle - Swelling       59-year-old male presents for evaluation right lower ankle swelling more than 5 months duration  He denies trauma or inciting event  He denies any associated pain  He does report having some numbness at the medial aspect of his foot/arch  He denies feeling of instability  Reports that swelling has progressively worsening over the past few months  He was seen by primary care provider and referred for ultrasound which was noted for cystic structure and he was referred to sports medicine            The following portions of the patient's history were reviewed and updated as appropriate: He  has a past medical history of "Kidney stone  He has No Known Allergies       Review of Systems   Constitutional: Negative for chills and fever  HENT: Negative for sore throat  Eyes: Negative for visual disturbance  Respiratory: Negative for shortness of breath  Cardiovascular: Negative for chest pain  Gastrointestinal: Negative for abdominal pain  Genitourinary: Negative for flank pain  Musculoskeletal: Positive for joint swelling  Negative for arthralgias  Skin: Negative for rash and wound  Neurological: Positive for numbness  Negative for weakness  Hematological: Does not bruise/bleed easily  Psychiatric/Behavioral: Negative for self-injury  Objective:  Vitals:    06/21/23 1120   BP: 170/62   Pulse: 64   Weight: 83 9 kg (185 lb)   Height: 5' 7\" (1 702 m)     Right Ankle Exam     Muscle Strength   Dorsiflexion:  5/5  Plantar flexion:  5/5    Other   Sensation: normal  Pulse: present           Observations     Right Ankle/Foot   Negative for deformity  Tenderness     Right Ankle/Foot   No tenderness in the Achilles insertion, anterior ankle, anterior talofibular ligament, fifth metatarsal base, calcaneofibular ligament, deltoid ligament, dorsum foot, lateral malleolus, medial malleolus, navicular, peroneal tendon, posterior tibial tendon, posterior talofibular ligament, proximal Achilles and talar dome  Active Range of Motion     Right Ankle/Foot   Dorsiflexion (kf): WFL  Plantar flexion: WFL  Inversion: WFL  Eversion: WFL    Strength/Myotome Testing     Right Ankle/Foot   Dorsiflexion: 5  Plantar flexion: 5  Inversion: 5  Eversion: 5    Tests     Right Ankle/Foot   Negative for anterior drawer, calcaneal squeeze, metatarsal squeeze, posterior drawer, syndesmosis squeeze and syndesmosis external rotation  Physical Exam  Vitals and nursing note reviewed  Constitutional:       General: He is not in acute distress  Appearance: He is well-developed  He is not ill-appearing or diaphoretic     HENT:      " "Head: Normocephalic and atraumatic  Right Ear: External ear normal       Left Ear: External ear normal    Eyes:      Conjunctiva/sclera: Conjunctivae normal    Neck:      Trachea: No tracheal deviation  Cardiovascular:      Rate and Rhythm: Normal rate  Pulmonary:      Effort: Pulmonary effort is normal  No respiratory distress  Abdominal:      General: There is no distension  Musculoskeletal:         General: Swelling present  No tenderness, deformity or signs of injury  Right ankle: No lateral malleolus, medial malleolus, ATF ligament, CF ligament, posterior TF ligament or base of 5th metatarsal tenderness  Anterior drawer test negative  Right foot: No deformity  Skin:     General: Skin is warm and dry  Coloration: Skin is not jaundiced or pale  Neurological:      Mental Status: He is alert and oriented to person, place, and time  Psychiatric:         Mood and Affect: Mood normal          Behavior: Behavior normal          Thought Content: Thought content normal          Judgment: Judgment normal            I have personally reviewed pertinent films in PACS and my interpretation is Well-circumscribed fluid collection anterolateral ankle  Medium joint arthrocentesis: R ankle  Universal Protocol:  Consent: Verbal consent obtained  Risks and benefits: risks, benefits and alternatives were discussed  Consent given by: patient  Time out: Immediately prior to procedure a \"time out\" was called to verify the correct patient, procedure, equipment, support staff and site/side marked as required    Patient understanding: patient states understanding of the procedure being performed  Patient consent: the patient's understanding of the procedure matches consent given  Procedure consent: procedure consent matches procedure scheduled  Relevant documents: relevant documents present and verified  Test results: test results available and properly labeled  Site marked: the operative site was " marked  Radiology Images displayed and confirmed   If images not available, report reviewed: imaging studies available  Required items: required blood products, implants, devices, and special equipment available  Patient identity confirmed: verbally with patient    Supporting Documentation  Indications: joint swelling   Procedure Details  Location: ankle - R ankle  Preparation: Patient was prepped and draped in the usual sterile fashion  Needle size: 18 G  Ultrasound guidance: no  Approach: anterolateral  Medications administered: 20 mg triamcinolone acetonide 40 mg/mL; 1 mL bupivacaine 0 25 %; 0 5 mL bupivacaine 0 25 %    Aspirate amount: 5 mL  Aspirate: serous    Patient tolerance: patient tolerated the procedure well with no immediate complications  Dressing:  Sterile dressing applied

## 2023-06-28 ENCOUNTER — TELEPHONE (OUTPATIENT)
Dept: FAMILY MEDICINE CLINIC | Facility: CLINIC | Age: 83
End: 2023-06-28

## 2023-06-28 NOTE — TELEPHONE ENCOUNTER
Pt is scheduled to see Esther Silva 07/14/23  Noris reyes is asking if Esther Silva wants him to have labs done prior     he has a TSH order and is asking if he ants to add to that ?? pls let pt know if he will add any labs & if he needs to fast

## 2023-07-12 ENCOUNTER — APPOINTMENT (OUTPATIENT)
Dept: LAB | Facility: CLINIC | Age: 83
End: 2023-07-12
Payer: COMMERCIAL

## 2023-07-12 DIAGNOSIS — R79.89 ELEVATED TSH: ICD-10-CM

## 2023-07-12 LAB — TSH SERPL DL<=0.05 MIU/L-ACNC: 6.09 UIU/ML (ref 0.45–4.5)

## 2023-07-12 PROCEDURE — 36415 COLL VENOUS BLD VENIPUNCTURE: CPT

## 2023-07-12 PROCEDURE — 84443 ASSAY THYROID STIM HORMONE: CPT

## 2023-07-12 PROCEDURE — 84439 ASSAY OF FREE THYROXINE: CPT

## 2023-07-13 LAB — T4 FREE SERPL-MCNC: 0.98 NG/DL (ref 0.61–1.12)

## 2023-07-14 ENCOUNTER — OFFICE VISIT (OUTPATIENT)
Dept: FAMILY MEDICINE CLINIC | Facility: CLINIC | Age: 83
End: 2023-07-14
Payer: COMMERCIAL

## 2023-07-14 VITALS
BODY MASS INDEX: 29.19 KG/M2 | SYSTOLIC BLOOD PRESSURE: 168 MMHG | OXYGEN SATURATION: 99 % | WEIGHT: 186 LBS | HEART RATE: 71 BPM | DIASTOLIC BLOOD PRESSURE: 78 MMHG | HEIGHT: 67 IN

## 2023-07-14 DIAGNOSIS — N18.31 STAGE 3A CHRONIC KIDNEY DISEASE (HCC): ICD-10-CM

## 2023-07-14 DIAGNOSIS — I10 PRIMARY HYPERTENSION: Primary | ICD-10-CM

## 2023-07-14 DIAGNOSIS — R01.1 CARDIAC MURMUR: ICD-10-CM

## 2023-07-14 PROCEDURE — 99214 OFFICE O/P EST MOD 30 MIN: CPT | Performed by: PHYSICIAN ASSISTANT

## 2023-07-14 RX ORDER — LOSARTAN POTASSIUM 25 MG/1
25 TABLET ORAL DAILY
Qty: 30 TABLET | Refills: 0 | Status: SHIPPED | OUTPATIENT
Start: 2023-07-14 | End: 2023-08-13

## 2023-07-14 NOTE — PROGRESS NOTES
Name: Bruno Mcclure      :       MRN: [id number]  Encounter Provider: Steph Hooks PA-C  Encounter Date: 2023   Encounter department: [address] [place] [address]     1. Primary hypertension  -     losartan (COZAAR) 25 mg tablet; Take 1 tablet (25 mg total) by mouth daily  -     Basic metabolic panel; Future    2. Cardiac murmur  -     Echo complete w/ contrast if indicated; Future; Expected date: 2023    3. Stage 3a chronic kidney disease (HCC)  -     losartan (COZAAR) 25 mg tablet; Take 1 tablet (25 mg total) by mouth daily  -     Basic metabolic panel; Future    BP remains above goal. Will begin losartan, check renal function within 1 week of initiating. Ambulatory BP monitoring. Update labs nephrology ordered, reschedule kidney US. schedule echo. No cardiac complaints. 1 month follow up, earlier prn       Subjective     Pt presents for follow up. He has a hx of HTN on amlodipine 10mg and CKD3a. He established with neprhology but no-showed renal US. Due for labs. BP 140s-170s/70s-80s at home. 168/78 today.      Lab Results       Component                Value               Date                       SODIUM                   132 (L)             2023                 K                        4.0                 2023                 CL                       104                 2023                 CO2                      26                  2023                 AGAP                     2 (L)               2023                 BUN                      21                  2023                 CREATININE               1.50 (H)            2023                 GLUF                     101 (H)             2023                 CALCIUM                  9.4                 2023                 AST                      25                  2023                 ALT                      31 05/23/2023                 ALKPHOS                  72                  05/23/2023                 TP                       8.0                 05/23/2023                 TBILI                    1.24 (H)            05/23/2023                 EGFR                     42                  05/23/2023                Review of Systems   Constitutional: Negative for chills, fatigue and fever. HENT: Negative for congestion, ear pain, hearing loss, nosebleeds, postnasal drip, rhinorrhea, sinus pressure, sinus pain, sneezing and sore throat. Eyes: Negative for pain, discharge, itching and visual disturbance. Respiratory: Negative for cough, chest tightness, shortness of breath and wheezing. Cardiovascular: Negative for chest pain, palpitations and leg swelling. Gastrointestinal: Negative for abdominal pain, blood in stool, constipation, diarrhea, nausea and vomiting. Genitourinary: Negative for frequency and urgency. Neurological: Negative for dizziness, light-headedness and numbness. Past Medical History:   Diagnosis Date   • Kidney stone      History reviewed. No pertinent surgical history. Family History   Problem Relation Age of Onset   • Hypertension Mother    • Hypertension Father    • Parkinsonism Father      Social History     Socioeconomic History   • Marital status:       Spouse name: None   • Number of children: None   • Years of education: None   • Highest education level: None   Occupational History   • None   Tobacco Use   • Smoking status: Never     Passive exposure: Never   • Smokeless tobacco: Never   Substance and Sexual Activity   • Alcohol use: None   • Drug use: None   • Sexual activity: None   Other Topics Concern   • None   Social History Narrative   • None     Social Determinants of Health     Financial Resource Strain: Low Risk  (5/16/2023)    Overall Financial Resource Strain (CARDIA)    • Difficulty of Paying Living Expenses: Not hard at all   Food Insecurity: Not on file   Transportation Needs: No Transportation Needs (5/16/2023)    PRAPARE - Transportation    • Lack of Transportation (Medical): No    • Lack of Transportation (Non-Medical): No   Physical Activity: Not on file   Stress: Not on file   Social Connections: Not on file   Intimate Partner Violence: Not on file   Housing Stability: Not on file     Current Outpatient Medications on File Prior to Visit   Medication Sig   • amLODIPine (NORVASC) 5 mg tablet Take 2 tablets (10 mg total) by mouth daily   • bimatoprost (LUMIGAN) 0.03 % ophthalmic drops    • Cholecalciferol (Vitamin D3) 1.25 MG (21464 UT) CAPS Take by mouth   • Chromium Picolinate (CHROMIUM PICOLATE PO) Take by mouth   • CINNAMON PO Take by mouth   • Coenzyme Q10 (COQ10 PO) Take by mouth   • Emollient (DHEA EX) Apply topically (Patient not taking: Reported on 0/78/1411)   • GARLIC 0256 PO Take by mouth   • vitamin B-12 (VITAMIN B-12) 1,000 mcg tablet Take by mouth daily   • Zinc Sulfate (ZINC-220 PO) Take by mouth     No Known Allergies    There is no immunization history on file for this patient. Objective     /78   Pulse 71   Ht 5' 7" (1.702 m)   Wt 84.4 kg (186 lb)   SpO2 99%   BMI 29.13 kg/m²     Physical Exam  Vitals and nursing note reviewed. Constitutional:       General: He is not in acute distress. Appearance: Normal appearance. HENT:      Head: Normocephalic and atraumatic. Nose: Nose normal.   Eyes:      Pupils: Pupils are equal, round, and reactive to light. Cardiovascular:      Rate and Rhythm: Normal rate and regular rhythm. Heart sounds: Murmur (2/6 RUSB/LUSB) heard. Pulmonary:      Effort: Pulmonary effort is normal. No respiratory distress. Breath sounds: Normal breath sounds. Musculoskeletal:         General: Normal range of motion. Cervical back: Normal range of motion and neck supple. Right lower leg: Edema (trace) present. Left lower leg: Edema (trace) present.    Skin:     General: Skin is warm and dry. Neurological:      Mental Status: He is alert and oriented to person, place, and time.    Psychiatric:         Mood and Affect: Mood and affect normal.       Jamilah Reilly PA-C

## 2023-07-26 ENCOUNTER — APPOINTMENT (OUTPATIENT)
Dept: LAB | Facility: CLINIC | Age: 83
End: 2023-07-26
Payer: COMMERCIAL

## 2023-07-26 DIAGNOSIS — I10 PRIMARY HYPERTENSION: ICD-10-CM

## 2023-07-26 DIAGNOSIS — N18.31 STAGE 3A CHRONIC KIDNEY DISEASE (HCC): ICD-10-CM

## 2023-07-26 LAB
25(OH)D3 SERPL-MCNC: 48.7 NG/ML (ref 30–100)
ALBUMIN SERPL BCP-MCNC: 3.8 G/DL (ref 3.5–5)
ALP SERPL-CCNC: 64 U/L (ref 46–116)
ALT SERPL W P-5'-P-CCNC: 29 U/L (ref 12–78)
ANION GAP SERPL CALCULATED.3IONS-SCNC: 4 MMOL/L
AST SERPL W P-5'-P-CCNC: 18 U/L (ref 5–45)
BACTERIA UR QL AUTO: ABNORMAL /HPF
BASOPHILS # BLD AUTO: 0.04 THOUSANDS/ÂΜL (ref 0–0.1)
BASOPHILS NFR BLD AUTO: 1 % (ref 0–1)
BILIRUB SERPL-MCNC: 0.87 MG/DL (ref 0.2–1)
BILIRUB UR QL STRIP: NEGATIVE
BUN SERPL-MCNC: 20 MG/DL (ref 5–25)
CALCIUM SERPL-MCNC: 9.2 MG/DL (ref 8.3–10.1)
CHLORIDE SERPL-SCNC: 109 MMOL/L (ref 96–108)
CLARITY UR: CLEAR
CO2 SERPL-SCNC: 28 MMOL/L (ref 21–32)
COLOR UR: ABNORMAL
CREAT SERPL-MCNC: 1.32 MG/DL (ref 0.6–1.3)
CREAT UR-MCNC: 90.5 MG/DL
EOSINOPHIL # BLD AUTO: 0.17 THOUSAND/ÂΜL (ref 0–0.61)
EOSINOPHIL NFR BLD AUTO: 3 % (ref 0–6)
ERYTHROCYTE [DISTWIDTH] IN BLOOD BY AUTOMATED COUNT: 12.4 % (ref 11.6–15.1)
GFR SERPL CREATININE-BSD FRML MDRD: 49 ML/MIN/1.73SQ M
GLUCOSE P FAST SERPL-MCNC: 108 MG/DL (ref 65–99)
GLUCOSE UR STRIP-MCNC: NEGATIVE MG/DL
HCT VFR BLD AUTO: 40.1 % (ref 36.5–49.3)
HGB BLD-MCNC: 13.2 G/DL (ref 12–17)
HGB UR QL STRIP.AUTO: NEGATIVE
IMM GRANULOCYTES # BLD AUTO: 0.01 THOUSAND/UL (ref 0–0.2)
IMM GRANULOCYTES NFR BLD AUTO: 0 % (ref 0–2)
KETONES UR STRIP-MCNC: NEGATIVE MG/DL
LEUKOCYTE ESTERASE UR QL STRIP: NEGATIVE
LYMPHOCYTES # BLD AUTO: 1.34 THOUSANDS/ÂΜL (ref 0.6–4.47)
LYMPHOCYTES NFR BLD AUTO: 27 % (ref 14–44)
MCH RBC QN AUTO: 30.1 PG (ref 26.8–34.3)
MCHC RBC AUTO-ENTMCNC: 32.9 G/DL (ref 31.4–37.4)
MCV RBC AUTO: 91 FL (ref 82–98)
MONOCYTES # BLD AUTO: 0.47 THOUSAND/ÂΜL (ref 0.17–1.22)
MONOCYTES NFR BLD AUTO: 10 % (ref 4–12)
NEUTROPHILS # BLD AUTO: 2.91 THOUSANDS/ÂΜL (ref 1.85–7.62)
NEUTS SEG NFR BLD AUTO: 59 % (ref 43–75)
NITRITE UR QL STRIP: NEGATIVE
NON-SQ EPI CELLS URNS QL MICRO: ABNORMAL /HPF
NRBC BLD AUTO-RTO: 0 /100 WBCS
PH UR STRIP.AUTO: 6.5 [PH]
PHOSPHATE SERPL-MCNC: 2.7 MG/DL (ref 2.3–4.1)
PLATELET # BLD AUTO: 192 THOUSANDS/UL (ref 149–390)
PMV BLD AUTO: 10.3 FL (ref 8.9–12.7)
POTASSIUM SERPL-SCNC: 4.2 MMOL/L (ref 3.5–5.3)
PROT SERPL-MCNC: 7.3 G/DL (ref 6.4–8.4)
PROT UR STRIP-MCNC: ABNORMAL MG/DL
PROT UR-MCNC: 18 MG/DL
PROT/CREAT UR: 0.2 MG/G{CREAT} (ref 0–0.1)
PTH-INTACT SERPL-MCNC: 52.1 PG/ML (ref 12–88)
RBC # BLD AUTO: 4.39 MILLION/UL (ref 3.88–5.62)
RBC #/AREA URNS AUTO: ABNORMAL /HPF
SODIUM SERPL-SCNC: 141 MMOL/L (ref 135–147)
SP GR UR STRIP.AUTO: 1.01 (ref 1–1.03)
UROBILINOGEN UR STRIP-ACNC: <2 MG/DL
WBC # BLD AUTO: 4.94 THOUSAND/UL (ref 4.31–10.16)
WBC #/AREA URNS AUTO: ABNORMAL /HPF

## 2023-07-26 PROCEDURE — 36415 COLL VENOUS BLD VENIPUNCTURE: CPT

## 2023-07-26 PROCEDURE — 84165 PROTEIN E-PHORESIS SERUM: CPT

## 2023-07-26 PROCEDURE — 83521 IG LIGHT CHAINS FREE EACH: CPT

## 2023-07-27 LAB
ALBUMIN SERPL ELPH-MCNC: 4.19 G/DL (ref 3.2–5.1)
ALBUMIN SERPL ELPH-MCNC: 59.8 % (ref 48–70)
ALPHA1 GLOB SERPL ELPH-MCNC: 0.29 G/DL (ref 0.15–0.47)
ALPHA1 GLOB SERPL ELPH-MCNC: 4.1 % (ref 1.8–7)
ALPHA2 GLOB SERPL ELPH-MCNC: 0.7 G/DL (ref 0.42–1.04)
ALPHA2 GLOB SERPL ELPH-MCNC: 10 % (ref 5.9–14.9)
BETA GLOB ABNORMAL SERPL ELPH-MCNC: 0.44 G/DL (ref 0.31–0.57)
BETA1 GLOB SERPL ELPH-MCNC: 6.3 % (ref 4.7–7.7)
BETA2 GLOB SERPL ELPH-MCNC: 5.4 % (ref 3.1–7.9)
BETA2+GAMMA GLOB SERPL ELPH-MCNC: 0.38 G/DL (ref 0.2–0.58)
GAMMA GLOB ABNORMAL SERPL ELPH-MCNC: 1.01 G/DL (ref 0.4–1.66)
GAMMA GLOB SERPL ELPH-MCNC: 14.4 % (ref 6.9–22.3)
IGG/ALB SER: 1.49 {RATIO} (ref 1.1–1.8)
KAPPA LC FREE SER-MCNC: 36.6 MG/L (ref 3.3–19.4)
KAPPA LC FREE/LAMBDA FREE SER: 2.1 {RATIO} (ref 0.26–1.65)
LAMBDA LC FREE SERPL-MCNC: 17.4 MG/L (ref 5.7–26.3)
PROT PATTERN SERPL ELPH-IMP: NORMAL
PROT SERPL-MCNC: 7 G/DL (ref 6.4–8.2)

## 2023-07-27 PROCEDURE — 84165 PROTEIN E-PHORESIS SERUM: CPT | Performed by: PATHOLOGY

## 2023-08-09 ENCOUNTER — TELEPHONE (OUTPATIENT)
Dept: NEPHROLOGY | Facility: CLINIC | Age: 83
End: 2023-08-09

## 2023-08-09 NOTE — TELEPHONE ENCOUNTER
Patient had an appointment schedule for with Dr. Thalia Purdy for 11/01/2023. I called and left message on patients answering machine that the appointment for 11/01/2023 needs to be reschedule due to Dr. Thalia Purdy will be out of the office .  Appointment reschedule for 01/03/2023 @ 2:00 pm

## 2023-08-24 ENCOUNTER — OFFICE VISIT (OUTPATIENT)
Dept: FAMILY MEDICINE CLINIC | Facility: CLINIC | Age: 83
End: 2023-08-24
Payer: COMMERCIAL

## 2023-08-24 VITALS
DIASTOLIC BLOOD PRESSURE: 74 MMHG | SYSTOLIC BLOOD PRESSURE: 193 MMHG | OXYGEN SATURATION: 96 % | HEART RATE: 71 BPM | HEIGHT: 67 IN | BODY MASS INDEX: 28.56 KG/M2 | WEIGHT: 182 LBS | TEMPERATURE: 97.2 F

## 2023-08-24 DIAGNOSIS — I10 PRIMARY HYPERTENSION: ICD-10-CM

## 2023-08-24 DIAGNOSIS — N18.31 STAGE 3A CHRONIC KIDNEY DISEASE (HCC): ICD-10-CM

## 2023-08-24 PROCEDURE — 99213 OFFICE O/P EST LOW 20 MIN: CPT | Performed by: PHYSICIAN ASSISTANT

## 2023-08-24 RX ORDER — LOSARTAN POTASSIUM 25 MG/1
25 TABLET ORAL DAILY
Qty: 90 TABLET | Refills: 1 | Status: SHIPPED | OUTPATIENT
Start: 2023-08-24 | End: 2024-02-20

## 2023-08-24 NOTE — PROGRESS NOTES
Name: Ld Valadez      :       MRN: [id number]  Encounter Provider: Sophia Holley PA-C  Encounter Date: 2023   Encounter department: [address] [place] [address]     1. Primary hypertension  -     losartan (COZAAR) 25 mg tablet; Take 1 tablet (25 mg total) by mouth daily  -     Basic metabolic panel; Future    2. Stage 3a chronic kidney disease (HCC)  -     losartan (COZAAR) 25 mg tablet; Take 1 tablet (25 mg total) by mouth daily  -     Basic metabolic panel; Future    clarified BP regimen, take 10mg amlodipine HS, 25mg Losartan qam. Check BMP and return in 1 week for BP check. Ambulatory BP monitoring discussed. Subjective     Pt presents for BP check. Last visit we added losartan, however pt stopped his amlodipine in place of the losartan instead of taking them together. His BP is high here today and at home. 193/74. No end organ complaints. Review of Systems   Constitutional: Negative for chills, fatigue and fever. HENT: Negative for congestion, ear pain, hearing loss, nosebleeds, postnasal drip, rhinorrhea, sinus pressure, sinus pain, sneezing and sore throat. Eyes: Negative for pain, discharge, itching and visual disturbance. Respiratory: Negative for cough, chest tightness, shortness of breath and wheezing. Cardiovascular: Negative for chest pain, palpitations and leg swelling. Gastrointestinal: Negative for abdominal pain, blood in stool, constipation, diarrhea, nausea and vomiting. Genitourinary: Negative for frequency and urgency. Neurological: Negative for dizziness, light-headedness and numbness. Past Medical History:   Diagnosis Date   • Kidney stone      History reviewed. No pertinent surgical history. Family History   Problem Relation Age of Onset   • Hypertension Mother    • Hypertension Father    • Parkinsonism Father      Social History     Socioeconomic History   • Marital status:       Spouse name: None • Number of children: None   • Years of education: None   • Highest education level: None   Occupational History   • None   Tobacco Use   • Smoking status: Never     Passive exposure: Never   • Smokeless tobacco: Never   Substance and Sexual Activity   • Alcohol use: None   • Drug use: None   • Sexual activity: None   Other Topics Concern   • None   Social History Narrative   • None     Social Determinants of Health     Financial Resource Strain: Low Risk  (5/16/2023)    Overall Financial Resource Strain (CARDIA)    • Difficulty of Paying Living Expenses: Not hard at all   Food Insecurity: Not on file   Transportation Needs: No Transportation Needs (5/16/2023)    PRAPARE - Transportation    • Lack of Transportation (Medical): No    • Lack of Transportation (Non-Medical): No   Physical Activity: Not on file   Stress: Not on file   Social Connections: Not on file   Intimate Partner Violence: Not on file   Housing Stability: Not on file     Current Outpatient Medications on File Prior to Visit   Medication Sig   • amLODIPine (NORVASC) 5 mg tablet Take 2 tablets (10 mg total) by mouth daily   • bimatoprost (LUMIGAN) 0.03 % ophthalmic drops    • Cholecalciferol (Vitamin D3) 1.25 MG (88767 UT) CAPS Take by mouth   • Chromium Picolinate (CHROMIUM PICOLATE PO) Take by mouth   • CINNAMON PO Take by mouth   • Coenzyme Q10 (COQ10 PO) Take by mouth   • Emollient (DHEA EX) Apply topically (Patient not taking: Reported on 2/60/1980)   • GARLIC 9828 PO Take by mouth   • vitamin B-12 (VITAMIN B-12) 1,000 mcg tablet Take by mouth daily   • Zinc Sulfate (ZINC-220 PO) Take by mouth   • [DISCONTINUED] losartan (COZAAR) 25 mg tablet Take 1 tablet (25 mg total) by mouth daily (Patient not taking: Reported on 8/24/2023)     No Known Allergies    There is no immunization history on file for this patient.     Objective     BP (!) 193/74   Pulse 71   Temp (!) 97.2 °F (36.2 °C)   Ht 5' 7" (1.702 m)   Wt 82.6 kg (182 lb)   SpO2 96% BMI 28.51 kg/m²     Physical Exam  Vitals and nursing note reviewed. Constitutional:       General: He is not in acute distress. Appearance: Normal appearance. HENT:      Head: Normocephalic and atraumatic. Nose: Nose normal.   Eyes:      Pupils: Pupils are equal, round, and reactive to light. Cardiovascular:      Rate and Rhythm: Normal rate and regular rhythm. Heart sounds: Normal heart sounds. No murmur heard. Pulmonary:      Effort: Pulmonary effort is normal. No respiratory distress. Breath sounds: Normal breath sounds. No wheezing, rhonchi or rales. Musculoskeletal:         General: Normal range of motion. Cervical back: Normal range of motion and neck supple. Skin:     General: Skin is warm and dry. Neurological:      Mental Status: He is alert and oriented to person, place, and time.    Psychiatric:         Mood and Affect: Mood and affect normal.       Wendy Torre PA-C

## 2023-09-01 ENCOUNTER — APPOINTMENT (OUTPATIENT)
Dept: LAB | Facility: CLINIC | Age: 83
End: 2023-09-01
Payer: COMMERCIAL

## 2023-09-01 DIAGNOSIS — N18.31 STAGE 3A CHRONIC KIDNEY DISEASE (HCC): ICD-10-CM

## 2023-09-01 DIAGNOSIS — I10 PRIMARY HYPERTENSION: ICD-10-CM

## 2023-09-05 ENCOUNTER — OFFICE VISIT (OUTPATIENT)
Dept: FAMILY MEDICINE CLINIC | Facility: CLINIC | Age: 83
End: 2023-09-05
Payer: COMMERCIAL

## 2023-09-05 VITALS
DIASTOLIC BLOOD PRESSURE: 70 MMHG | WEIGHT: 183 LBS | BODY MASS INDEX: 28.72 KG/M2 | HEART RATE: 70 BPM | TEMPERATURE: 97.2 F | HEIGHT: 67 IN | OXYGEN SATURATION: 98 % | SYSTOLIC BLOOD PRESSURE: 160 MMHG

## 2023-09-05 DIAGNOSIS — N18.31 STAGE 3A CHRONIC KIDNEY DISEASE (HCC): ICD-10-CM

## 2023-09-05 DIAGNOSIS — I10 PRIMARY HYPERTENSION: Primary | ICD-10-CM

## 2023-09-05 DIAGNOSIS — F43.81 PROLONGED GRIEF DISORDER: ICD-10-CM

## 2023-09-05 PROCEDURE — 99214 OFFICE O/P EST MOD 30 MIN: CPT | Performed by: PHYSICIAN ASSISTANT

## 2023-09-05 RX ORDER — LOSARTAN POTASSIUM 50 MG/1
50 TABLET ORAL DAILY
Qty: 90 TABLET | Refills: 0 | Status: SHIPPED | OUTPATIENT
Start: 2023-09-05 | End: 2023-12-04

## 2023-09-05 NOTE — PROGRESS NOTES
Name: Rachele Aparicio      :       MRN: [id number]  Encounter Provider: Casa Tijerina PA-C  Encounter Date: 2023   Encounter department: [address] [place] [address]     1. Primary hypertension  -     losartan (COZAAR) 50 mg tablet; Take 1 tablet (50 mg total) by mouth daily  -     Basic metabolic panel; Future    2. Stage 3a chronic kidney disease (HCC)  -     losartan (COZAAR) 50 mg tablet; Take 1 tablet (50 mg total) by mouth daily  -     Basic metabolic panel; Future    3. Prolonged grief disorder  -     Ambulatory Referral to [facility]; Future    BP improved, not at goal. Increase losartan to 50mg. Check BMP within 1 week. Continue amlodipine 10mg. Continue with nephrology. Refer to behavioral health therapy. 1 month follow up, earlier prn       Subjective     Pt presents for BP check. 160/70 today on amlodipine 10mg and losartan 25mg. In setting of CKD3a with improvement in GFR since initiating ARB. Not checking at home. He continues to note significant sadness over the passing of his wife in February of this year. He misses her every day, he would be open to seeing a behavioral health therapist.     Review of Systems   Constitutional: Negative for chills, fatigue and fever. HENT: Negative for congestion, ear pain, hearing loss, nosebleeds, postnasal drip, rhinorrhea, sinus pressure, sinus pain, sneezing and sore throat. Eyes: Negative for pain, discharge, itching and visual disturbance. Respiratory: Negative for cough, chest tightness, shortness of breath and wheezing. Cardiovascular: Negative for chest pain, palpitations and leg swelling. Gastrointestinal: Negative for abdominal pain, blood in stool, constipation, diarrhea, nausea and vomiting. Genitourinary: Negative for frequency and urgency. Neurological: Negative for dizziness, light-headedness and numbness. Psychiatric/Behavioral: Positive for dysphoric mood. Past Medical History:   Diagnosis Date   • Kidney stone      History reviewed. No pertinent surgical history. Family History   Problem Relation Age of Onset   • Hypertension Mother    • Hypertension Father    • Parkinsonism Father      Social History     Socioeconomic History   • Marital status:      Spouse name: None   • Number of children: None   • Years of education: None   • Highest education level: None   Occupational History   • None   Tobacco Use   • Smoking status: Never     Passive exposure: Never   • Smokeless tobacco: Never   Substance and Sexual Activity   • Alcohol use: None   • Drug use: None   • Sexual activity: None   Other Topics Concern   • None   Social History Narrative   • None     Social Determinants of Health     Financial Resource Strain: Low Risk  (5/16/2023)    Overall Financial Resource Strain (CARDIA)    • Difficulty of Paying Living Expenses: Not hard at all   Food Insecurity: Not on file   Transportation Needs: No Transportation Needs (5/16/2023)    PRAPARE - Transportation    • Lack of Transportation (Medical): No    • Lack of Transportation (Non-Medical):  No   Physical Activity: Not on file   Stress: Not on file   Social Connections: Not on file   Intimate Partner Violence: Not on file   Housing Stability: Not on file     Current Outpatient Medications on File Prior to Visit   Medication Sig   • amLODIPine (NORVASC) 5 mg tablet Take 2 tablets (10 mg total) by mouth daily   • bimatoprost (LUMIGAN) 0.03 % ophthalmic drops    • Cholecalciferol (Vitamin D3) 1.25 MG (36209 UT) CAPS Take by mouth   • Chromium Picolinate (CHROMIUM PICOLATE PO) Take by mouth   • CINNAMON PO Take by mouth   • Coenzyme Q10 (COQ10 PO) Take by mouth   • Emollient (DHEA EX) Apply topically (Patient not taking: Reported on 3/06/2436)   • GARLIC 1837 PO Take by mouth   • vitamin B-12 (VITAMIN B-12) 1,000 mcg tablet Take by mouth daily   • Zinc Sulfate (ZINC-220 PO) Take by mouth   • [DISCONTINUED] losartan (COZAAR) 25 mg tablet Take 1 tablet (25 mg total) by mouth daily     No Known Allergies    There is no immunization history on file for this patient. Objective     /70   Pulse 70   Temp (!) 97.2 °F (36.2 °C)   Ht 5' 7" (1.702 m)   Wt 83 kg (183 lb)   SpO2 98%   BMI 28.66 kg/m²     Physical Exam  Vitals and nursing note reviewed. Constitutional:       General: He is not in acute distress. Appearance: Normal appearance. HENT:      Head: Normocephalic and atraumatic. Nose: Nose normal.   Eyes:      Pupils: Pupils are equal, round, and reactive to light. Cardiovascular:      Rate and Rhythm: Normal rate and regular rhythm. Heart sounds: Normal heart sounds. No murmur heard. Pulmonary:      Effort: Pulmonary effort is normal. No respiratory distress. Breath sounds: Normal breath sounds. No wheezing or rales. Musculoskeletal:         General: Normal range of motion. Cervical back: Normal range of motion and neck supple. Right lower leg: Edema (trace) present. Left lower leg: Edema (trace) present. Skin:     General: Skin is warm and dry. Neurological:      Mental Status: He is alert and oriented to person, place, and time.    Psychiatric:         Mood and Affect: Mood and affect normal.       Brenda Foster PA-C

## 2023-10-11 ENCOUNTER — OFFICE VISIT (OUTPATIENT)
Dept: FAMILY MEDICINE CLINIC | Facility: CLINIC | Age: 83
End: 2023-10-11
Payer: COMMERCIAL

## 2023-10-11 VITALS
OXYGEN SATURATION: 96 % | DIASTOLIC BLOOD PRESSURE: 54 MMHG | HEART RATE: 66 BPM | BODY MASS INDEX: 29.51 KG/M2 | TEMPERATURE: 96.6 F | HEIGHT: 67 IN | SYSTOLIC BLOOD PRESSURE: 160 MMHG | WEIGHT: 188 LBS

## 2023-10-11 DIAGNOSIS — R01.1 CARDIAC MURMUR: ICD-10-CM

## 2023-10-11 DIAGNOSIS — N18.31 STAGE 3A CHRONIC KIDNEY DISEASE (HCC): ICD-10-CM

## 2023-10-11 DIAGNOSIS — I10 PRIMARY HYPERTENSION: Primary | ICD-10-CM

## 2023-10-11 DIAGNOSIS — R60.0 BILATERAL LOWER EXTREMITY EDEMA: ICD-10-CM

## 2023-10-11 PROCEDURE — 99214 OFFICE O/P EST MOD 30 MIN: CPT | Performed by: PHYSICIAN ASSISTANT

## 2023-10-11 NOTE — PATIENT INSTRUCTIONS
Start new hctz pill in addition to all others  Complete echo  Complete blood work  See me in 2 weeks (end of October)

## 2023-10-11 NOTE — PROGRESS NOTES
Name: Kristie Ortiz      :       MRN: [id number]  Encounter Provider: Brenda Foster PA-C  Encounter Date: 10/11/2023   Encounter department: [address] [place] [address]     1. Primary hypertension  -     B-Type Natriuretic Peptide(BNP); Future    2. Stage 3a chronic kidney disease (HCC)    3. Cardiac murmur  -     B-Type Natriuretic Peptide(BNP); Future    4. Bilateral lower extremity edema  -     B-Type Natriuretic Peptide(BNP); Future    BP improved, near goal. Avoid hypotension in CKD. Add hctz, closely monitor renal function. Continue amlodipine 10mg and losartan 50mg as well. Repeat BMP within a week. Pt to complete previously ordered echo. Wear compression socks, limit salt and keep legs elevated at rest. There was a 5lb wieght gain over a 6 week period, add BNP. Lungs clear, no cardiac/respiratory complaints. 2 week follow up. Subjective     Pt presents for BP check. BMP not complete. Currently on amlodipine 10mg, losartan 50mg. /54. Unable to check at home. In setting of CKD. Last GFR 52 at baseline. Follows with nephrology. Notes some leg swelling still. Sometimes has pain in the lower R lateral leg, he has back pain as well and is unsure if related. Review of Systems   Constitutional:  Negative for chills, fatigue and fever. HENT:  Negative for congestion, ear pain, hearing loss, nosebleeds, postnasal drip, rhinorrhea, sinus pressure, sinus pain, sneezing and sore throat. Eyes:  Negative for pain, discharge, itching and visual disturbance. Respiratory:  Negative for cough, chest tightness, shortness of breath and wheezing. Cardiovascular:  Positive for leg swelling. Negative for chest pain and palpitations. Gastrointestinal:  Negative for abdominal pain, blood in stool, constipation, diarrhea, nausea and vomiting. Genitourinary:  Negative for frequency and urgency. Musculoskeletal:  Positive for back pain.    Neurological: Negative for dizziness, light-headedness and numbness. Past Medical History:   Diagnosis Date   • Kidney stone      History reviewed. No pertinent surgical history. Family History   Problem Relation Age of Onset   • Hypertension Mother    • Hypertension Father    • Parkinsonism Father      Social History     Socioeconomic History   • Marital status:      Spouse name: None   • Number of children: None   • Years of education: None   • Highest education level: None   Occupational History   • None   Tobacco Use   • Smoking status: Never     Passive exposure: Never   • Smokeless tobacco: Never   Substance and Sexual Activity   • Alcohol use: None   • Drug use: None   • Sexual activity: None   Other Topics Concern   • None   Social History Narrative   • None     Social Determinants of Health     Financial Resource Strain: Low Risk  (5/16/2023)    Overall Financial Resource Strain (CARDIA)    • Difficulty of Paying Living Expenses: Not hard at all   Food Insecurity: Not on file   Transportation Needs: No Transportation Needs (5/16/2023)    PRAPARE - Transportation    • Lack of Transportation (Medical): No    • Lack of Transportation (Non-Medical):  No   Physical Activity: Not on file   Stress: Not on file   Social Connections: Not on file   Intimate Partner Violence: Not on file   Housing Stability: Not on file     Current Outpatient Medications on File Prior to Visit   Medication Sig   • amLODIPine (NORVASC) 5 mg tablet Take 2 tablets (10 mg total) by mouth daily   • bimatoprost (LUMIGAN) 0.03 % ophthalmic drops    • Cholecalciferol (Vitamin D3) 1.25 MG (52955 UT) CAPS Take by mouth   • Chromium Picolinate (CHROMIUM PICOLATE PO) Take by mouth   • CINNAMON PO Take by mouth   • Coenzyme Q10 (COQ10 PO) Take by mouth   • Emollient (DHEA EX) Apply topically (Patient not taking: Reported on 7/43/6714)   • GARLIC 5280 PO Take by mouth   • losartan (COZAAR) 50 mg tablet Take 1 tablet (50 mg total) by mouth daily   • vitamin B-12 (VITAMIN B-12) 1,000 mcg tablet Take by mouth daily   • Zinc Sulfate (ZINC-220 PO) Take by mouth     No Known Allergies    There is no immunization history on file for this patient. Objective     /54   Pulse 66   Temp (!) 96.6 °F (35.9 °C)   Ht 5' 7" (1.702 m)   Wt 85.3 kg (188 lb)   SpO2 96%   BMI 29.44 kg/m²     Physical Exam  Vitals and nursing note reviewed. Constitutional:       General: He is not in acute distress. Appearance: Normal appearance. HENT:      Head: Normocephalic and atraumatic. Nose: Nose normal.   Eyes:      Pupils: Pupils are equal, round, and reactive to light. Cardiovascular:      Rate and Rhythm: Normal rate and regular rhythm. Heart sounds: Murmur heard. Pulmonary:      Effort: Pulmonary effort is normal. No respiratory distress. Breath sounds: Normal breath sounds. No wheezing, rhonchi or rales. Musculoskeletal:         General: Normal range of motion. Cervical back: Normal range of motion and neck supple. Right lower leg: Edema (1+) present. Left lower leg: Edema (1+) present. Skin:     General: Skin is warm and dry. Neurological:      Mental Status: He is alert and oriented to person, place, and time.    Psychiatric:         Mood and Affect: Mood and affect normal.       Sulaiman Seth PA-C

## 2023-10-12 ENCOUNTER — TELEPHONE (OUTPATIENT)
Dept: FAMILY MEDICINE CLINIC | Facility: CLINIC | Age: 83
End: 2023-10-12

## 2023-10-12 DIAGNOSIS — I10 PRIMARY HYPERTENSION: Primary | ICD-10-CM

## 2023-10-12 RX ORDER — HYDROCHLOROTHIAZIDE 25 MG/1
25 TABLET ORAL DAILY
Qty: 90 TABLET | Refills: 0 | Status: SHIPPED | OUTPATIENT
Start: 2023-10-12 | End: 2024-01-10

## 2023-10-12 NOTE — TELEPHONE ENCOUNTER
Kg Starks called, you had advised him to start HCTZ but it was never sent to [facility], can you please send that in for him?

## 2023-10-27 ENCOUNTER — APPOINTMENT (OUTPATIENT)
Dept: LAB | Facility: CLINIC | Age: 83
End: 2023-10-27
Payer: COMMERCIAL

## 2023-10-27 DIAGNOSIS — R60.0 BILATERAL LOWER EXTREMITY EDEMA: ICD-10-CM

## 2023-10-27 DIAGNOSIS — N18.31 STAGE 3A CHRONIC KIDNEY DISEASE (HCC): ICD-10-CM

## 2023-10-27 DIAGNOSIS — I10 PRIMARY HYPERTENSION: ICD-10-CM

## 2023-10-27 DIAGNOSIS — R01.1 CARDIAC MURMUR: ICD-10-CM

## 2023-10-27 LAB
ANION GAP SERPL CALCULATED.3IONS-SCNC: 11 MMOL/L
BNP SERPL-MCNC: 317 PG/ML (ref 0–100)
BUN SERPL-MCNC: 23 MG/DL (ref 5–25)
CALCIUM SERPL-MCNC: 9.2 MG/DL (ref 8.4–10.2)
CHLORIDE SERPL-SCNC: 102 MMOL/L (ref 96–108)
CO2 SERPL-SCNC: 24 MMOL/L (ref 21–32)
CREAT SERPL-MCNC: 1.44 MG/DL (ref 0.6–1.3)
GFR SERPL CREATININE-BSD FRML MDRD: 44 ML/MIN/1.73SQ M
GLUCOSE P FAST SERPL-MCNC: 113 MG/DL (ref 65–99)
POTASSIUM SERPL-SCNC: 4.3 MMOL/L (ref 3.5–5.3)
SODIUM SERPL-SCNC: 137 MMOL/L (ref 135–147)

## 2023-10-27 PROCEDURE — 36415 COLL VENOUS BLD VENIPUNCTURE: CPT

## 2023-10-27 PROCEDURE — 83880 ASSAY OF NATRIURETIC PEPTIDE: CPT

## 2023-10-27 PROCEDURE — 80048 BASIC METABOLIC PNL TOTAL CA: CPT

## 2023-11-01 ENCOUNTER — OFFICE VISIT (OUTPATIENT)
Dept: FAMILY MEDICINE CLINIC | Facility: CLINIC | Age: 83
End: 2023-11-01
Payer: COMMERCIAL

## 2023-11-01 VITALS
WEIGHT: 186 LBS | HEART RATE: 65 BPM | SYSTOLIC BLOOD PRESSURE: 162 MMHG | DIASTOLIC BLOOD PRESSURE: 70 MMHG | HEIGHT: 67 IN | BODY MASS INDEX: 29.19 KG/M2 | TEMPERATURE: 96.9 F | OXYGEN SATURATION: 99 %

## 2023-11-01 DIAGNOSIS — N18.31 STAGE 3A CHRONIC KIDNEY DISEASE (HCC): ICD-10-CM

## 2023-11-01 DIAGNOSIS — I10 PRIMARY HYPERTENSION: ICD-10-CM

## 2023-11-01 PROCEDURE — 99214 OFFICE O/P EST MOD 30 MIN: CPT | Performed by: PHYSICIAN ASSISTANT

## 2023-11-01 RX ORDER — LOSARTAN POTASSIUM 50 MG/1
100 TABLET ORAL DAILY
Qty: 180 TABLET | Refills: 0
Start: 2023-11-01 | End: 2024-01-30

## 2023-11-01 NOTE — PROGRESS NOTES
Name: Belle Hicks      : [id number]      MRN: [id number]  Encounter Provider: Leita Opitz, PA-C  Encounter Date: 2023   Encounter department: [address] [place] [address]     1. Primary hypertension  -     losartan (COZAAR) 50 mg tablet; Take 2 tablets (100 mg total) by mouth daily  -     Basic metabolic panel; Future    2. Stage 3a chronic kidney disease (HCC)  -     losartan (COZAAR) 50 mg tablet; Take 2 tablets (100 mg total) by mouth daily  -     Basic metabolic panel; Future    After discussion will increase losartan to 100mg. In setting of CKD check BMP within 5 days. Mild proteinuria. Continue amlodipine 10mg. Continue hctz 25mg. Stay hydrated. Avoid nsaids. Cannot check home BP. 2 week follow up. Echo scheduled . If no improvement with incrase losartan, will expand 2ndary htn workup to include aldosterone/renin ratio and RAUS       Subjective     Pt presents for follow up BP check. We have many several blood pressure checks over the past few months and his BP continues to be resistant. On amlodipine 10mg, losartan 50mg and most recently HCTZ 25mg. BP today is basically unchanged from previous, 162/70. Renal function has been stable. He scheduled his echo. He had mild elevated TSH with normal FT4. Mild proteinuria. He follows with nephrology      Review of Systems   Constitutional:  Negative for chills, fatigue and fever. HENT:  Negative for congestion, ear pain, hearing loss, nosebleeds, postnasal drip, rhinorrhea, sinus pressure, sinus pain, sneezing and sore throat. Eyes:  Negative for pain, discharge, itching and visual disturbance. Respiratory:  Negative for cough, chest tightness, shortness of breath and wheezing. Cardiovascular:  Negative for chest pain, palpitations and leg swelling. Gastrointestinal:  Negative for abdominal pain, blood in stool, constipation, diarrhea, nausea and vomiting.    Genitourinary:  Negative for frequency and urgency. Neurological:  Negative for dizziness, light-headedness and numbness. Past Medical History:   Diagnosis Date   • Kidney stone      History reviewed. No pertinent surgical history. Family History   Problem Relation Age of Onset   • Hypertension Mother    • Hypertension Father    • Parkinsonism Father      Social History     Socioeconomic History   • Marital status:      Spouse name: None   • Number of children: None   • Years of education: None   • Highest education level: None   Occupational History   • None   Tobacco Use   • Smoking status: Never     Passive exposure: Never   • Smokeless tobacco: Never   Substance and Sexual Activity   • Alcohol use: None   • Drug use: None   • Sexual activity: None   Other Topics Concern   • None   Social History Narrative   • None     Social Determinants of Health     Financial Resource Strain: Low Risk  (5/16/2023)    Overall Financial Resource Strain (CARDIA)    • Difficulty of Paying Living Expenses: Not hard at all   Food Insecurity: Not on file   Transportation Needs: No Transportation Needs (5/16/2023)    PRAPARE - Transportation    • Lack of Transportation (Medical): No    • Lack of Transportation (Non-Medical):  No   Physical Activity: Not on file   Stress: Not on file   Social Connections: Not on file   Intimate Partner Violence: Not on file   Housing Stability: Not on file     Current Outpatient Medications on File Prior to Visit   Medication Sig   • amLODIPine (NORVASC) 5 mg tablet Take 2 tablets (10 mg total) by mouth daily   • bimatoprost (LUMIGAN) 0.03 % ophthalmic drops    • Cholecalciferol (Vitamin D3) 1.25 MG (45685 UT) CAPS Take by mouth   • Chromium Picolinate (CHROMIUM PICOLATE PO) Take by mouth   • CINNAMON PO Take by mouth   • Coenzyme Q10 (COQ10 PO) Take by mouth   • Emollient (DHEA EX) Apply topically (Patient not taking: Reported on 4/89/0123)   • GARLIC 3256 PO Take by mouth   • hydrochlorothiazide (HYDRODIURIL) 25 mg tablet Take 1 tablet (25 mg total) by mouth daily   • vitamin B-12 (VITAMIN B-12) 1,000 mcg tablet Take by mouth daily   • Zinc Sulfate (ZINC-220 PO) Take by mouth   • [DISCONTINUED] losartan (COZAAR) 50 mg tablet Take 1 tablet (50 mg total) by mouth daily     No Known Allergies    There is no immunization history on file for this patient. Objective     /70 (BP Location: Left arm, Patient Position: Sitting, Cuff Size: Large)   Pulse 65   Temp (!) 96.9 °F (36.1 °C)   Ht 5' 7" (1.702 m)   Wt 84.4 kg (186 lb)   SpO2 99%   BMI 29.13 kg/m²     Physical Exam  Vitals and nursing note reviewed. Constitutional:       General: He is not in acute distress. Appearance: Normal appearance. HENT:      Head: Normocephalic and atraumatic. Nose: Nose normal.   Eyes:      Pupils: Pupils are equal, round, and reactive to light. Cardiovascular:      Rate and Rhythm: Normal rate and regular rhythm. Heart sounds: Normal heart sounds. No murmur heard. Pulmonary:      Effort: Pulmonary effort is normal. No respiratory distress. Breath sounds: Normal breath sounds. No wheezing, rhonchi or rales. Musculoskeletal:         General: Normal range of motion. Cervical back: Normal range of motion and neck supple. Right lower leg: Edema (improved) present. Left lower leg: Edema (improved) present. Skin:     General: Skin is warm and dry. Neurological:      Mental Status: He is alert and oriented to person, place, and time.    Psychiatric:         Mood and Affect: Mood and affect normal.       Sunni Meade PA-C

## 2023-11-07 ENCOUNTER — APPOINTMENT (OUTPATIENT)
Dept: LAB | Facility: CLINIC | Age: 83
End: 2023-11-07
Payer: COMMERCIAL

## 2023-11-07 DIAGNOSIS — I10 PRIMARY HYPERTENSION: ICD-10-CM

## 2023-11-07 DIAGNOSIS — N18.31 STAGE 3A CHRONIC KIDNEY DISEASE (HCC): ICD-10-CM

## 2023-11-07 LAB
ANION GAP SERPL CALCULATED.3IONS-SCNC: 8 MMOL/L
BUN SERPL-MCNC: 37 MG/DL (ref 5–25)
CALCIUM SERPL-MCNC: 9.1 MG/DL (ref 8.4–10.2)
CHLORIDE SERPL-SCNC: 101 MMOL/L (ref 96–108)
CO2 SERPL-SCNC: 27 MMOL/L (ref 21–32)
CREAT SERPL-MCNC: 1.56 MG/DL (ref 0.6–1.3)
GFR SERPL CREATININE-BSD FRML MDRD: 40 ML/MIN/1.73SQ M
GLUCOSE P FAST SERPL-MCNC: 108 MG/DL (ref 65–99)
POTASSIUM SERPL-SCNC: 4.2 MMOL/L (ref 3.5–5.3)
SODIUM SERPL-SCNC: 136 MMOL/L (ref 135–147)

## 2023-11-07 PROCEDURE — 36415 COLL VENOUS BLD VENIPUNCTURE: CPT

## 2023-11-07 PROCEDURE — 80048 BASIC METABOLIC PNL TOTAL CA: CPT

## 2023-11-08 DIAGNOSIS — N18.31 STAGE 3A CHRONIC KIDNEY DISEASE (HCC): Primary | ICD-10-CM

## 2023-11-10 ENCOUNTER — APPOINTMENT (OUTPATIENT)
Dept: LAB | Facility: CLINIC | Age: 83
End: 2023-11-10
Payer: COMMERCIAL

## 2023-11-10 DIAGNOSIS — N18.31 STAGE 3A CHRONIC KIDNEY DISEASE (HCC): ICD-10-CM

## 2023-11-10 LAB
ANION GAP SERPL CALCULATED.3IONS-SCNC: 7 MMOL/L
BUN SERPL-MCNC: 26 MG/DL (ref 5–25)
CALCIUM SERPL-MCNC: 9.2 MG/DL (ref 8.4–10.2)
CHLORIDE SERPL-SCNC: 99 MMOL/L (ref 96–108)
CO2 SERPL-SCNC: 27 MMOL/L (ref 21–32)
CREAT SERPL-MCNC: 1.39 MG/DL (ref 0.6–1.3)
GFR SERPL CREATININE-BSD FRML MDRD: 46 ML/MIN/1.73SQ M
GLUCOSE P FAST SERPL-MCNC: 109 MG/DL (ref 65–99)
POTASSIUM SERPL-SCNC: 4.3 MMOL/L (ref 3.5–5.3)
SODIUM SERPL-SCNC: 133 MMOL/L (ref 135–147)

## 2023-11-10 PROCEDURE — 36415 COLL VENOUS BLD VENIPUNCTURE: CPT

## 2023-11-10 PROCEDURE — 80048 BASIC METABOLIC PNL TOTAL CA: CPT

## 2023-11-17 ENCOUNTER — OFFICE VISIT (OUTPATIENT)
Dept: FAMILY MEDICINE CLINIC | Facility: CLINIC | Age: 83
End: 2023-11-17
Payer: COMMERCIAL

## 2023-11-17 ENCOUNTER — TELEPHONE (OUTPATIENT)
Dept: FAMILY MEDICINE CLINIC | Facility: CLINIC | Age: 83
End: 2023-11-17

## 2023-11-17 VITALS
BODY MASS INDEX: 28.88 KG/M2 | DIASTOLIC BLOOD PRESSURE: 70 MMHG | WEIGHT: 184 LBS | SYSTOLIC BLOOD PRESSURE: 162 MMHG | TEMPERATURE: 98.4 F | OXYGEN SATURATION: 98 % | HEART RATE: 66 BPM | HEIGHT: 67 IN

## 2023-11-17 DIAGNOSIS — R01.1 CARDIAC MURMUR: ICD-10-CM

## 2023-11-17 DIAGNOSIS — I10 PRIMARY HYPERTENSION: ICD-10-CM

## 2023-11-17 DIAGNOSIS — I44.1 WENCKEBACH SECOND DEGREE AV BLOCK: Primary | ICD-10-CM

## 2023-11-17 DIAGNOSIS — N18.31 STAGE 3A CHRONIC KIDNEY DISEASE (HCC): ICD-10-CM

## 2023-11-17 PROBLEM — I48.91 ATRIAL FIBRILLATION WITH SLOW VENTRICULAR RESPONSE (HCC): Status: ACTIVE | Noted: 2023-11-17

## 2023-11-17 PROCEDURE — 93000 ELECTROCARDIOGRAM COMPLETE: CPT | Performed by: PHYSICIAN ASSISTANT

## 2023-11-17 PROCEDURE — 99214 OFFICE O/P EST MOD 30 MIN: CPT | Performed by: PHYSICIAN ASSISTANT

## 2023-11-17 NOTE — PROGRESS NOTES
Name: Jose Amezquita      : [id number]      MRN: [id number]  Encounter Provider: Johana Houston PA-C  Encounter Date: 2023   Encounter department: [address] [place] [address]     1. Wenckebach second degree AV block  -     Ambulatory Referral to Cardiology; Future    2. Primary hypertension  -     POCT ECG    3. Cardiac murmur  -     POCT ECG    4. Stage 3a chronic kidney disease (HCC)    BP remains high, recommend he takes 100mg losartan, amlodipine 10mg and hctz 25mg. Exam today with noted bradycardia and irregular rhythm. Chronic murmur. EKG read says afib slow ventricular response but there are P waves. Contacted cardiology who has concern for mobitz 2-1 AV block. Will contact pt for holter placement. ER precautions provided. Cardiology follow up ASAP. Avoid AV jessica blocking agents. 1 month follow up, earlier prn        Depression Screening and Follow-up Plan: Patient was screened for depression during today's encounter. They screened negative with a PHQ-2 score of 0. Subjective     Pt presents for BP follow up    Last visit we increased losartan from 50mg to 100mg. Also on amlodipine 10mg and hctz 25mg. Unfortunately he stopped taking 100mg of losartan and went back to 50mg. He is unable to check BP today. Thus his BP is unchanged from previous today, 162/70. He has echo scheduled . No end organ complaints.      Lab Results       Component                Value               Date                       SODIUM                   133 (L)             11/10/2023                 K                        4.3                 11/10/2023                 CL                       99                  11/10/2023                 CO2                      27                  11/10/2023                 BUN                      26 (H)              11/10/2023                 CREATININE               1.39 (H)            11/10/2023                 CALCIUM                  9.2 11/10/2023              Cr at baseline (1.3-1.5)      Review of Systems   Constitutional:  Negative for chills and fever. HENT:  Negative for ear pain and sore throat. Eyes:  Negative for pain and visual disturbance. Respiratory:  Negative for cough and shortness of breath. Cardiovascular:  Negative for chest pain and palpitations. Gastrointestinal:  Negative for abdominal pain and vomiting. Genitourinary:  Negative for dysuria and hematuria. Musculoskeletal:  Negative for arthralgias and back pain. Skin:  Negative for color change and rash. Neurological:  Negative for seizures and syncope. All other systems reviewed and are negative. Past Medical History:   Diagnosis Date   • Kidney stone      History reviewed. No pertinent surgical history. Family History   Problem Relation Age of Onset   • Hypertension Mother    • Hypertension Father    • Parkinsonism Father      Social History     Socioeconomic History   • Marital status:      Spouse name: None   • Number of children: None   • Years of education: None   • Highest education level: None   Occupational History   • None   Tobacco Use   • Smoking status: Never     Passive exposure: Never   • Smokeless tobacco: Never   Substance and Sexual Activity   • Alcohol use: None   • Drug use: None   • Sexual activity: None   Other Topics Concern   • None   Social History Narrative   • None     Social Determinants of Health     Financial Resource Strain: Low Risk  (5/16/2023)    Overall Financial Resource Strain (CARDIA)    • Difficulty of Paying Living Expenses: Not hard at all   Food Insecurity: Not on file   Transportation Needs: No Transportation Needs (5/16/2023)    PRAPARE - Transportation    • Lack of Transportation (Medical): No    • Lack of Transportation (Non-Medical):  No   Physical Activity: Not on file   Stress: Not on file   Social Connections: Not on file   Intimate Partner Violence: Not on file   Housing Stability: Not on file     Current Outpatient Medications on File Prior to Visit   Medication Sig   • amLODIPine (NORVASC) 5 mg tablet Take 2 tablets (10 mg total) by mouth daily   • bimatoprost (LUMIGAN) 0.03 % ophthalmic drops    • Cholecalciferol (Vitamin D3) 1.25 MG (84952 UT) CAPS Take by mouth   • Chromium Picolinate (CHROMIUM PICOLATE PO) Take by mouth   • CINNAMON PO Take by mouth   • Coenzyme Q10 (COQ10 PO) Take by mouth   • Emollient (DHEA EX) Apply topically (Patient not taking: Reported on 2/65/7197)   • GARLIC 7175 PO Take by mouth   • hydrochlorothiazide (HYDRODIURIL) 25 mg tablet Take 1 tablet (25 mg total) by mouth daily   • losartan (COZAAR) 50 mg tablet Take 2 tablets (100 mg total) by mouth daily   • vitamin B-12 (VITAMIN B-12) 1,000 mcg tablet Take by mouth daily   • Zinc Sulfate (ZINC-220 PO) Take by mouth     No Known Allergies    There is no immunization history on file for this patient. Objective     /70 (BP Location: Left arm, Patient Position: Sitting, Cuff Size: Large)   Pulse 66   Temp 98.4 °F (36.9 °C)   Ht 5' 7" (1.702 m)   Wt 83.5 kg (184 lb)   SpO2 98%   BMI 28.82 kg/m²     Physical Exam  Vitals and nursing note reviewed. Constitutional:       General: He is not in acute distress. Appearance: Normal appearance. HENT:      Head: Normocephalic and atraumatic. Nose: Nose normal.      Mouth/Throat:      Mouth: Mucous membranes are moist.      Pharynx: Oropharynx is clear. No oropharyngeal exudate or posterior oropharyngeal erythema. Eyes:      Pupils: Pupils are equal, round, and reactive to light. Cardiovascular:      Rate and Rhythm: Bradycardia present. Rhythm irregular. Heart sounds: Murmur heard. Pulmonary:      Effort: Pulmonary effort is normal. No respiratory distress. Breath sounds: Normal breath sounds. Abdominal:      General: Bowel sounds are normal.      Palpations: Abdomen is soft. Musculoskeletal:         General: Normal range of motion. Cervical back: Normal range of motion and neck supple. Skin:     General: Skin is warm and dry. Neurological:      Mental Status: He is alert and oriented to person, place, and time.    Psychiatric:         Mood and Affect: Mood and affect normal.       Yokasta Reasons, PA-C

## 2023-11-17 NOTE — TELEPHONE ENCOUNTER
Called patient - [facility]. Valente Curtis would like for him to come back into office to have a Holter placed for 5 days.

## 2023-11-21 ENCOUNTER — CLINICAL SUPPORT (OUTPATIENT)
Dept: FAMILY MEDICINE CLINIC | Facility: CLINIC | Age: 83
End: 2023-11-21
Payer: COMMERCIAL

## 2023-11-21 DIAGNOSIS — I48.91 ATRIAL FIBRILLATION WITH SLOW VENTRICULAR RESPONSE (HCC): Primary | ICD-10-CM

## 2023-11-21 PROCEDURE — 93242 EXT ECG>48HR<7D RECORDING: CPT | Performed by: PHYSICIAN ASSISTANT

## 2023-11-22 ENCOUNTER — TELEPHONE (OUTPATIENT)
Dept: CARDIOLOGY CLINIC | Facility: CLINIC | Age: 83
End: 2023-11-22

## 2023-11-22 ENCOUNTER — CONSULT (OUTPATIENT)
Dept: CARDIOLOGY CLINIC | Facility: CLINIC | Age: 83
End: 2023-11-22
Payer: COMMERCIAL

## 2023-11-22 VITALS
WEIGHT: 187 LBS | OXYGEN SATURATION: 97 % | SYSTOLIC BLOOD PRESSURE: 158 MMHG | HEART RATE: 67 BPM | HEIGHT: 67 IN | DIASTOLIC BLOOD PRESSURE: 62 MMHG | BODY MASS INDEX: 29.35 KG/M2

## 2023-11-22 DIAGNOSIS — I44.1 SECOND DEGREE AV BLOCK, MOBITZ TYPE I: ICD-10-CM

## 2023-11-22 DIAGNOSIS — I44.1 WENCKEBACH SECOND DEGREE AV BLOCK: ICD-10-CM

## 2023-11-22 DIAGNOSIS — I49.8 ARRHYTHMIA, ATRIAL: Primary | ICD-10-CM

## 2023-11-22 PROCEDURE — 99204 OFFICE O/P NEW MOD 45 MIN: CPT | Performed by: INTERNAL MEDICINE

## 2023-11-22 PROCEDURE — 93000 ELECTROCARDIOGRAM COMPLETE: CPT | Performed by: INTERNAL MEDICINE

## 2023-11-22 NOTE — TELEPHONE ENCOUNTER
----- Message from Mellissa Liao PA-C sent at 11/22/2023  8:25 AM EST -----  He can be scheduled with someone somewhere else who has 1st avail  ----- Message -----  From: Tianna Dewey  Sent: 11/21/2023   1:34 PM EST  To: Yasmine Young; Mellissa Liao PA-C; #    Italia Malloy,    We are booked solid for new patients until March. Should patient be scheduled elsewhere or is a specific provider willing to see the patient?    Please Advise    ----- Message -----  From: Mellissa Liao PA-C  Sent: 11/17/2023   4:33 PM EST  To: Cardiology [facility]    Pls call and give a  NEW PT APPT,  with 1st avail DOCTOR, in [facility] in next 1-2 weeks    Dx: av block

## 2023-11-22 NOTE — PROGRESS NOTES
Cardiology Consultation     Tiarra Albrecht  [phone]  0[birth year]15  [facility] [address] [facility] [address] [facility] [place] [address] [address]    HPI:  79-year-old  who was referred because of Mobitz 1 second-degree AV block. He has no history of cardiac disease. He has fairly active walking up to 25 to 50 minutes at a time without symptoms. He denies passing out or lightheadedness. He has no history of cardiac disease other than a heart murmur. He is having a Biotel and I have ordered a stress test to see if the heart block resolves with exercise. 1. Arrhythmia, atrial  -     POCT ECG    2. Second degree AV block, Mobitz type I  -     Stress test only, exercise; Future; Expected date: 11/22/2023      Patient Active Problem List   Diagnosis    Primary hypertension    Stage 3a chronic kidney disease ([address])    Cardiac murmur    Atrial fibrillation with slow ventricular response (HCC)    Second degree AV block, Mobitz type I     Past Medical History:   Diagnosis Date    Kidney stone      Social History     Socioeconomic History    Marital status:       Spouse name: Not on file    Number of children: Not on file    Years of education: Not on file    Highest education level: Not on file   Occupational History    Not on file   Tobacco Use    Smoking status: Never     Passive exposure: Never    Smokeless tobacco: Never   Substance and Sexual Activity    Alcohol use: Not on file    Drug use: Not on file    Sexual activity: Not on file   Other Topics Concern    Not on file   Social History Narrative    Not on file     Social Determinants of Health     Financial Resource Strain: Low Risk  (5/16/2023)    Overall Financial Resource Strain (CARDIA)     Difficulty of Paying Living Expenses: Not hard at all   Food Insecurity: Not on file   Transportation Needs: No Transportation Needs (5/16/2023)    Psychiatric Transportation     Lack of Transportation (Medical): No     Lack of Transportation (Non-Medical): No   Physical Activity: Not on file   Stress: Not on file   Social Connections: Not on file   Intimate Partner Violence: Not on file   Housing Stability: Not on file      Family History   Problem Relation Age of Onset    Hypertension Mother     Hypertension Father     Parkinsonism Father      History reviewed. No pertinent surgical history.     Current Outpatient Medications:     amLODIPine (NORVASC) 5 mg tablet, Take 2 tablets (10 mg total) by mouth daily, Disp: 180 tablet, Rfl: 3    bimatoprost (LUMIGAN) 0.03 % ophthalmic drops, , Disp: , Rfl:     Cholecalciferol (Vitamin D3) 1.25 MG (48842 UT) CAPS, Take by mouth, Disp: , Rfl:     Chromium Picolinate (CHROMIUM PICOLATE PO), Take by mouth, Disp: , Rfl:     CINNAMON PO, Take by mouth, Disp: , Rfl:     Coenzyme Q10 (COQ10 PO), Take by mouth, Disp: , Rfl:     GARLIC 2552 PO, Take by mouth, Disp: , Rfl:     hydrochlorothiazide (HYDRODIURIL) 25 mg tablet, Take 1 tablet (25 mg total) by mouth daily, Disp: 90 tablet, Rfl: 0    losartan (COZAAR) 50 mg tablet, Take 2 tablets (100 mg total) by mouth daily, Disp: 180 tablet, Rfl: 0    vitamin B-12 (VITAMIN B-12) 1,000 mcg tablet, Take by mouth daily, Disp: , Rfl:     Zinc Sulfate (ZINC-220 PO), Take by mouth, Disp: , Rfl:     Emollient (DHEA EX), Apply topically (Patient not taking: Reported on 5/30/2023), Disp: , Rfl:   No Known Allergies  Vitals:    11/22/23 1456   BP: 158/62   BP Location: Left arm   Patient Position: Sitting   Cuff Size: Standard   Pulse: 67   SpO2: 97%   Weight: 84.8 kg (187 lb)   Height: 5' 7" (1.702 m)       Labs:  Appointment on 11/10/2023   Component Date Value    Sodium 11/10/2023 133 (L)     Potassium 11/10/2023 4.3     Chloride 11/10/2023 99     CO2 11/10/2023 27     ANION GAP 11/10/2023 7     BUN 11/10/2023 26 (H)     Creatinine 11/10/2023 1.39 (H)     Glucose, Fasting 11/10/2023 109 (H)     Calcium 11/10/2023 9.2     eGFR 11/10/2023 46    Appointment on 11/07/2023   Component Date Value    Sodium 11/07/2023 136     Potassium 11/07/2023 4.2     Chloride 11/07/2023 101     CO2 11/07/2023 27     ANION GAP 11/07/2023 8     BUN 11/07/2023 37 (H)     Creatinine 11/07/2023 1.56 (H)     Glucose, Fasting 11/07/2023 108 (H)     Calcium 11/07/2023 9.1     eGFR 11/07/2023 40    Appointment on 10/27/2023   Component Date Value    Sodium 10/27/2023 137     Potassium 10/27/2023 4.3     Chloride 10/27/2023 102     CO2 10/27/2023 24     ANION GAP 10/27/2023 11     BUN 10/27/2023 23     Creatinine 10/27/2023 1.44 (H)     Glucose, Fasting 10/27/2023 113 (H)     Calcium 10/27/2023 9.2     eGFR 10/27/2023 44     BNP 10/27/2023 317 (H)      Imaging: No results found. Review of Systems:  History of kidney stones. History of hemorrhoids. History of hypertension. Physical Exam:  158/62. Heart rate 45 and slightly irregular. Skin warm dry. Pupils are equal.  Carotids 2+ without bruits. Lungs clear. Rhythm regular. Grade 2 short systolic ejection murmur at the base. No organomegaly. Good pulses. No edema. Good strength in extremities. EKG shows Mobitz 1 second-degree AV block but otherwise is unremarkable. Discussion/Summary:    1. Mobitz 1 second-degree AV block-asymptomatic  2. Aortic sclerosis    Recommendations:    1. Exercise test to see if conduction normalizes with exercise  2. Discussed possibility of pacemaker in the future  3. Echocardiogram and Biotel pending  4.   Return after exercise test            Duke Cronin MD

## 2023-11-27 ENCOUNTER — CLINICAL SUPPORT (OUTPATIENT)
Dept: FAMILY MEDICINE CLINIC | Facility: CLINIC | Age: 83
End: 2023-11-27
Payer: COMMERCIAL

## 2023-11-27 DIAGNOSIS — I48.91 ATRIAL FIBRILLATION WITH SLOW VENTRICULAR RESPONSE (HCC): Primary | ICD-10-CM

## 2023-11-27 PROCEDURE — 93244 EXT ECG>48HR<7D REV&INTERPJ: CPT | Performed by: FAMILY MEDICINE

## 2023-11-28 ENCOUNTER — HOSPITAL ENCOUNTER (OUTPATIENT)
Dept: NON INVASIVE DIAGNOSTICS | Facility: CLINIC | Age: 83
Discharge: HOME | End: 2023-11-28
Payer: COMMERCIAL

## 2023-11-28 ENCOUNTER — TELEPHONE (OUTPATIENT)
Dept: FAMILY MEDICINE CLINIC | Facility: CLINIC | Age: 83
End: 2023-11-28

## 2023-11-28 VITALS
WEIGHT: 187 LBS | SYSTOLIC BLOOD PRESSURE: 158 MMHG | HEART RATE: 47 BPM | HEIGHT: 67 IN | BODY MASS INDEX: 29.35 KG/M2 | DIASTOLIC BLOOD PRESSURE: 62 MMHG

## 2023-11-28 DIAGNOSIS — R01.1 CARDIAC MURMUR: ICD-10-CM

## 2023-11-28 LAB
AORTIC ROOT: 3.2 CM
AORTIC VALVE MEAN VELOCITY: 11.3 M/S
APICAL FOUR CHAMBER EJECTION FRACTION: 59 %
ASCENDING AORTA: 2.6 CM
AV AREA BY CONTINUOUS VTI: 2.3 CM2
AV AREA PEAK VELOCITY: 2.2 CM2
AV LVOT MEAN GRADIENT: 3 MMHG
AV LVOT PEAK GRADIENT: 6 MMHG
AV MEAN GRADIENT: 6 MMHG
AV PEAK GRADIENT: 13 MMHG
AV VALVE AREA: 2.25 CM2
AV VELOCITY RATIO: 0.7
DOP CALC AO PEAK VEL: 1.79 M/S
DOP CALC AO VTI: 40.74 CM
DOP CALC LVOT AREA: 3.14 CM2
DOP CALC LVOT DIAMETER: 2 CM
DOP CALC LVOT PEAK VEL VTI: 29.18 CM
DOP CALC LVOT PEAK VEL: 1.26 M/S
DOP CALC LVOT STROKE INDEX: 46.2 ML/M2
DOP CALC LVOT STROKE VOLUME: 91.63
FRACTIONAL SHORTENING: 36 (ref 28–44)
INTERVENTRICULAR SEPTUM IN DIASTOLE (PARASTERNAL SHORT AXIS VIEW): 1.1 CM
INTERVENTRICULAR SEPTUM: 1.1 CM (ref 0.6–1.1)
IVC: 15 MM
LAAS-AP2: 24.4 CM2
LAAS-AP4: 15.1 CM2
LEFT ATRIUM SIZE: 4.3 CM
LEFT ATRIUM VOLUME (MOD BIPLANE): 58 ML
LEFT ATRIUM VOLUME INDEX (MOD BIPLANE): 29.4 ML/M2
LEFT INTERNAL DIMENSION IN SYSTOLE: 2.9 CM (ref 2.1–4)
LEFT VENTRICULAR INTERNAL DIMENSION IN DIASTOLE: 4.5 CM (ref 3.5–6)
LEFT VENTRICULAR POSTERIOR WALL IN END DIASTOLE: 1.1 CM
LEFT VENTRICULAR STROKE VOLUME: 60 ML
LVSV (TEICH): 60 ML
RIGHT ATRIUM AREA SYSTOLE A4C: 20.2 CM2
RIGHT VENTRICLE ID DIMENSION: 4.1 CM
SL CV LEFT ATRIUM LENGTH A2C: 5.9 CM
SL CV LV EF: 60
SL CV PED ECHO LEFT VENTRICLE DIASTOLIC VOLUME (MOD BIPLANE) 2D: 92 ML
SL CV PED ECHO LEFT VENTRICLE SYSTOLIC VOLUME (MOD BIPLANE) 2D: 32 ML
TR MAX PG: 20 MMHG
TR PEAK VELOCITY: 2.2 M/S
TRICUSPID ANNULAR PLANE SYSTOLIC EXCURSION: 2.8 CM
TRICUSPID VALVE PEAK REGURGITATION VELOCITY: 2.24 M/S

## 2023-11-28 PROCEDURE — 93306 TTE W/DOPPLER COMPLETE: CPT | Performed by: INTERNAL MEDICINE

## 2023-11-28 PROCEDURE — 93306 TTE W/DOPPLER COMPLETE: CPT

## 2023-11-28 NOTE — TELEPHONE ENCOUNTER
High grade AV block, 3.2 second pause, minimum HR 25bpm. Message sent to cardiology.  Follow up with cardiology to discuss pacer need

## 2023-11-29 ENCOUNTER — HOSPITAL ENCOUNTER (OUTPATIENT)
Dept: NON INVASIVE DIAGNOSTICS | Facility: CLINIC | Age: 83
Discharge: HOME | End: 2023-11-29
Payer: COMMERCIAL

## 2023-11-29 VITALS
SYSTOLIC BLOOD PRESSURE: 160 MMHG | WEIGHT: 187 LBS | HEIGHT: 67 IN | HEART RATE: 54 BPM | OXYGEN SATURATION: 100 % | DIASTOLIC BLOOD PRESSURE: 62 MMHG | BODY MASS INDEX: 29.35 KG/M2

## 2023-11-29 DIAGNOSIS — I44.1 SECOND DEGREE AV BLOCK, MOBITZ TYPE I: ICD-10-CM

## 2023-11-29 LAB
MAX HR PERCENT: 83 %
MAX HR: 113 BPM
RATE PRESSURE PRODUCT: NORMAL
SL CV STRESS RECOVERY BP: NORMAL MMHG
SL CV STRESS RECOVERY HR: 55 BPM
SL CV STRESS RECOVERY O2 SAT: 100 %
SL CV STRESS STAGE REACHED: 2
STRESS ANGINA INDEX: 0
STRESS BASELINE BP: NORMAL MMHG
STRESS BASELINE HR: 54 BPM
STRESS O2 SAT REST: 100 %
STRESS PEAK HR: 113 BPM
STRESS POST ESTIMATED WORKLOAD: 6.1 METS
STRESS POST EXERCISE DUR MIN: 10 MIN
STRESS POST EXERCISE DUR SEC: 17 SEC
STRESS POST O2 SAT PEAK: 100 %
STRESS POST PEAK BP: 172 MMHG

## 2023-11-29 PROCEDURE — 93016 CV STRESS TEST SUPVJ ONLY: CPT | Performed by: INTERNAL MEDICINE

## 2023-11-29 PROCEDURE — 93017 CV STRESS TEST TRACING ONLY: CPT

## 2023-11-29 PROCEDURE — 93018 CV STRESS TEST I&R ONLY: CPT | Performed by: INTERNAL MEDICINE

## 2023-11-30 DIAGNOSIS — N18.31 STAGE 3A CHRONIC KIDNEY DISEASE (HCC): ICD-10-CM

## 2023-11-30 DIAGNOSIS — I10 PRIMARY HYPERTENSION: ICD-10-CM

## 2023-11-30 LAB
MAX HR PERCENT: 85 %
MAX HR: 106 BPM
STRESS BASELINE BP: NORMAL MMHG
STRESS BASELINE HR: 47 BPM
STRESS POST ESTIMATED WORKLOAD: 6.1 METS
STRESS POST PEAK HR: 113 BPM
STRESS POST PEAK SYSTOLIC BP: 172 MMHG

## 2023-11-30 RX ORDER — LOSARTAN POTASSIUM 50 MG/1
100 TABLET ORAL DAILY
Qty: 180 TABLET | Refills: 0 | OUTPATIENT
Start: 2023-11-30 | End: 2024-02-28

## 2023-12-01 DIAGNOSIS — N18.31 STAGE 3A CHRONIC KIDNEY DISEASE (HCC): ICD-10-CM

## 2023-12-01 DIAGNOSIS — I10 PRIMARY HYPERTENSION: ICD-10-CM

## 2023-12-01 RX ORDER — LOSARTAN POTASSIUM 50 MG/1
50 TABLET ORAL DAILY
Qty: 90 TABLET | Refills: 0 | Status: SHIPPED | OUTPATIENT
Start: 2023-12-01

## 2023-12-22 ENCOUNTER — APPOINTMENT (OUTPATIENT)
Dept: LAB | Facility: CLINIC | Age: 83
End: 2023-12-22
Payer: COMMERCIAL

## 2023-12-22 DIAGNOSIS — N18.31 STAGE 3A CHRONIC KIDNEY DISEASE (HCC): ICD-10-CM

## 2023-12-22 LAB
25(OH)D3 SERPL-MCNC: 55.6 NG/ML (ref 30–100)
ALBUMIN SERPL BCP-MCNC: 4.2 G/DL (ref 3.5–5)
ALP SERPL-CCNC: 61 U/L (ref 34–104)
ALT SERPL W P-5'-P-CCNC: 18 U/L (ref 7–52)
ANION GAP SERPL CALCULATED.3IONS-SCNC: 9 MMOL/L
AST SERPL W P-5'-P-CCNC: 17 U/L (ref 13–39)
BACTERIA UR QL AUTO: NORMAL /HPF
BASOPHILS # BLD AUTO: 0.07 THOUSANDS/ÂΜL (ref 0–0.1)
BASOPHILS NFR BLD AUTO: 1 % (ref 0–1)
BILIRUB SERPL-MCNC: 0.6 MG/DL (ref 0.2–1)
BILIRUB UR QL STRIP: NEGATIVE
BUN SERPL-MCNC: 35 MG/DL (ref 5–25)
CALCIUM SERPL-MCNC: 9.7 MG/DL (ref 8.4–10.2)
CHLORIDE SERPL-SCNC: 102 MMOL/L (ref 96–108)
CLARITY UR: CLEAR
CO2 SERPL-SCNC: 26 MMOL/L (ref 21–32)
COLOR UR: NORMAL
CREAT SERPL-MCNC: 1.5 MG/DL (ref 0.6–1.3)
CREAT UR-MCNC: 72.6 MG/DL
EOSINOPHIL # BLD AUTO: 0.27 THOUSAND/ÂΜL (ref 0–0.61)
EOSINOPHIL NFR BLD AUTO: 5 % (ref 0–6)
ERYTHROCYTE [DISTWIDTH] IN BLOOD BY AUTOMATED COUNT: 12.1 % (ref 11.6–15.1)
GFR SERPL CREATININE-BSD FRML MDRD: 42 ML/MIN/1.73SQ M
GLUCOSE P FAST SERPL-MCNC: 97 MG/DL (ref 65–99)
GLUCOSE UR STRIP-MCNC: NEGATIVE MG/DL
HCT VFR BLD AUTO: 38.4 % (ref 36.5–49.3)
HGB BLD-MCNC: 13 G/DL (ref 12–17)
HGB UR QL STRIP.AUTO: NEGATIVE
IMM GRANULOCYTES # BLD AUTO: 0.01 THOUSAND/UL (ref 0–0.2)
IMM GRANULOCYTES NFR BLD AUTO: 0 % (ref 0–2)
KETONES UR STRIP-MCNC: NEGATIVE MG/DL
LEUKOCYTE ESTERASE UR QL STRIP: NEGATIVE
LYMPHOCYTES # BLD AUTO: 1.52 THOUSANDS/ÂΜL (ref 0.6–4.47)
LYMPHOCYTES NFR BLD AUTO: 27 % (ref 14–44)
MCH RBC QN AUTO: 30.4 PG (ref 26.8–34.3)
MCHC RBC AUTO-ENTMCNC: 33.9 G/DL (ref 31.4–37.4)
MCV RBC AUTO: 90 FL (ref 82–98)
MONOCYTES # BLD AUTO: 0.59 THOUSAND/ÂΜL (ref 0.17–1.22)
MONOCYTES NFR BLD AUTO: 11 % (ref 4–12)
NEUTROPHILS # BLD AUTO: 3.08 THOUSANDS/ÂΜL (ref 1.85–7.62)
NEUTS SEG NFR BLD AUTO: 56 % (ref 43–75)
NITRITE UR QL STRIP: NEGATIVE
NON-SQ EPI CELLS URNS QL MICRO: NORMAL /HPF
NRBC BLD AUTO-RTO: 0 /100 WBCS
PH UR STRIP.AUTO: 5.5 [PH]
PLATELET # BLD AUTO: 253 THOUSANDS/UL (ref 149–390)
PMV BLD AUTO: 10.2 FL (ref 8.9–12.7)
POTASSIUM SERPL-SCNC: 4.7 MMOL/L (ref 3.5–5.3)
PROT SERPL-MCNC: 7.2 G/DL (ref 6.4–8.4)
PROT UR STRIP-MCNC: NEGATIVE MG/DL
PROT UR-MCNC: 6 MG/DL
PROT/CREAT UR: 0.08 MG/G{CREAT} (ref 0–0.1)
PTH-INTACT SERPL-MCNC: 48.7 PG/ML (ref 12–88)
RBC # BLD AUTO: 4.27 MILLION/UL (ref 3.88–5.62)
RBC #/AREA URNS AUTO: NORMAL /HPF
SODIUM SERPL-SCNC: 137 MMOL/L (ref 135–147)
SP GR UR STRIP.AUTO: 1.01 (ref 1–1.03)
UROBILINOGEN UR STRIP-ACNC: <2 MG/DL
WBC # BLD AUTO: 5.54 THOUSAND/UL (ref 4.31–10.16)
WBC #/AREA URNS AUTO: NORMAL /HPF

## 2023-12-22 PROCEDURE — 82306 VITAMIN D 25 HYDROXY: CPT

## 2023-12-22 PROCEDURE — 83970 ASSAY OF PARATHORMONE: CPT

## 2023-12-22 PROCEDURE — 81001 URINALYSIS AUTO W/SCOPE: CPT

## 2023-12-22 PROCEDURE — 85025 COMPLETE CBC W/AUTO DIFF WBC: CPT

## 2023-12-22 PROCEDURE — 80053 COMPREHEN METABOLIC PANEL: CPT

## 2023-12-22 PROCEDURE — 84156 ASSAY OF PROTEIN URINE: CPT

## 2023-12-22 PROCEDURE — 82570 ASSAY OF URINE CREATININE: CPT

## 2023-12-22 PROCEDURE — 36415 COLL VENOUS BLD VENIPUNCTURE: CPT

## 2024-01-11 ENCOUNTER — RA CDI HCC (OUTPATIENT)
Dept: OTHER | Facility: HOSPITAL | Age: 84
End: 2024-01-11

## 2024-01-17 DIAGNOSIS — N18.31 STAGE 3A CHRONIC KIDNEY DISEASE (HCC): ICD-10-CM

## 2024-01-17 DIAGNOSIS — I10 PRIMARY HYPERTENSION: ICD-10-CM

## 2024-01-17 RX ORDER — LOSARTAN POTASSIUM 50 MG/1
50 TABLET ORAL DAILY
Qty: 90 TABLET | Refills: 0 | Status: SHIPPED | OUTPATIENT
Start: 2024-01-17 | End: 2024-01-19 | Stop reason: SDUPTHER

## 2024-01-17 NOTE — TELEPHONE ENCOUNTER
Called pt to confirm his appt for tomorrow and he asked if we can refill his losartan please. He doesn't have anymore left. Thank you.

## 2024-01-18 ENCOUNTER — OFFICE VISIT (OUTPATIENT)
Dept: FAMILY MEDICINE CLINIC | Facility: CLINIC | Age: 84
End: 2024-01-18
Payer: COMMERCIAL

## 2024-01-18 VITALS
HEART RATE: 51 BPM | DIASTOLIC BLOOD PRESSURE: 60 MMHG | SYSTOLIC BLOOD PRESSURE: 156 MMHG | TEMPERATURE: 97.8 F | WEIGHT: 184 LBS | HEIGHT: 67 IN | BODY MASS INDEX: 28.88 KG/M2 | OXYGEN SATURATION: 100 %

## 2024-01-18 DIAGNOSIS — I44.1 SECOND DEGREE AV BLOCK, MOBITZ TYPE I: Primary | ICD-10-CM

## 2024-01-18 DIAGNOSIS — N18.31 STAGE 3A CHRONIC KIDNEY DISEASE (HCC): ICD-10-CM

## 2024-01-18 DIAGNOSIS — I10 PRIMARY HYPERTENSION: ICD-10-CM

## 2024-01-18 PROBLEM — I48.91 ATRIAL FIBRILLATION WITH SLOW VENTRICULAR RESPONSE (HCC): Status: RESOLVED | Noted: 2023-11-17 | Resolved: 2024-01-18

## 2024-01-18 PROCEDURE — 99214 OFFICE O/P EST MOD 30 MIN: CPT | Performed by: PHYSICIAN ASSISTANT

## 2024-01-18 RX ORDER — HYDROCHLOROTHIAZIDE 25 MG/1
25 TABLET ORAL DAILY
Qty: 90 TABLET | Refills: 0 | Status: SHIPPED | OUTPATIENT
Start: 2024-01-18 | End: 2024-04-17

## 2024-01-19 ENCOUNTER — TELEPHONE (OUTPATIENT)
Dept: FAMILY MEDICINE CLINIC | Facility: CLINIC | Age: 84
End: 2024-01-19

## 2024-01-19 DIAGNOSIS — I10 PRIMARY HYPERTENSION: ICD-10-CM

## 2024-01-19 DIAGNOSIS — N18.31 STAGE 3A CHRONIC KIDNEY DISEASE (HCC): ICD-10-CM

## 2024-01-19 RX ORDER — LOSARTAN POTASSIUM 100 MG/1
100 TABLET ORAL DAILY
Qty: 90 TABLET | Refills: 1 | Status: SHIPPED | OUTPATIENT
Start: 2024-01-19 | End: 2024-07-17

## 2024-01-19 NOTE — TELEPHONE ENCOUNTER
1/19/2024 pt left message on machine that you increased his Losartan to twice daily so he ran out the script was sent yesterday for one daily.

## 2024-01-22 NOTE — PROGRESS NOTES
"Name: Aurelio Thomas      : 1940      MRN: [id number]  Encounter Provider: Nicholas Clemens PA-C  Encounter Date: 2024   Encounter department: [facility]    Assessment & Plan     1. Second degree AV block, Mobitz type I    2. Primary hypertension  -     hydrochlorothiazide (HYDRODIURIL) 25 mg tablet; Take 1 tablet (25 mg total) by mouth daily    3. Stage 3a chronic kidney disease (HCC)    Reviewed stress testing per HPI. Continue closely with cardiology to discuss potential pacemaker need. Return/ER precautions provided. BP remains a bit above goal. Would appreciate cardiology input in BP control given need to avoid AV blocking agents. In setting of stable CKD. 6 month follow up, earlier prn       Subjective     Pt presents for follow up. Stress test reviewed as below. He follows with cardiology. /60 today. No active cardiac complaints. He follows with nephrology and renal function stable at baseline. +arb    \"1.  Fair exercise tolerance-6.1 METS  2.  No chest discomfort with exercise  3.  Resting EKG showed Mobitz 1 heart block second-degree  4.  With exercise heart rate increased appropriately and conduction also improved to first-degree block  5.  Negative EKG stress test\"       Review of Systems   Constitutional:  Negative for chills, fatigue and fever.   HENT:  Negative for congestion, ear pain, hearing loss, nosebleeds, postnasal drip, rhinorrhea, sinus pressure, sinus pain, sneezing and sore throat.    Eyes:  Negative for pain, discharge, itching and visual disturbance.   Respiratory:  Negative for cough, chest tightness, shortness of breath and wheezing.    Cardiovascular:  Negative for chest pain, palpitations and leg swelling.   Gastrointestinal:  Negative for abdominal pain, blood in stool, constipation, diarrhea, nausea and vomiting.   Genitourinary:  Negative for frequency and urgency.   Neurological:  Negative for dizziness, light-headedness and numbness. "       Past Medical History:   Diagnosis Date   • Kidney stone      History reviewed. No pertinent surgical history.  Family History   Problem Relation Age of Onset   • Hypertension Mother    • Hypertension Father    • Parkinsonism Father      Social History     Socioeconomic History   • Marital status:      Spouse name: None   • Number of children: None   • Years of education: None   • Highest education level: None   Occupational History   • None   Tobacco Use   • Smoking status: Never     Passive exposure: Never   • Smokeless tobacco: Never   Substance and Sexual Activity   • Alcohol use: None   • Drug use: None   • Sexual activity: None   Other Topics Concern   • None   Social History Narrative   • None     Social Determinants of Health     Financial Resource Strain: Low Risk  (5/16/2023)    Overall Financial Resource Strain (CARDIA)    • Difficulty of Paying Living Expenses: Not hard at all   Food Insecurity: Not on file   Transportation Needs: No Transportation Needs (5/16/2023)    PRAPARE - Transportation    • Lack of Transportation (Medical): No    • Lack of Transportation (Non-Medical): No   Physical Activity: Not on file   Stress: Not on file   Social Connections: Not on file   Intimate Partner Violence: Not on file   Housing Stability: Not on file     Current Outpatient Medications on File Prior to Visit   Medication Sig   • amLODIPine (NORVASC) 5 mg tablet Take 2 tablets (10 mg total) by mouth daily   • bimatoprost (LUMIGAN) 0.03 % ophthalmic drops    • Cholecalciferol (Vitamin D3) 1.25 MG (82305 UT) CAPS Take by mouth   • Chromium Picolinate (CHROMIUM PICOLATE PO) Take by mouth   • CINNAMON PO Take by mouth   • Coenzyme Q10 (COQ10 PO) Take by mouth   • Emollient (DHEA EX) Apply topically (Patient not taking: Reported on 5/30/2023)   • GARLIC 1500 PO Take by mouth   • vitamin B-12 (VITAMIN B-12) 1,000 mcg tablet Take by mouth daily   • Zinc Sulfate (ZINC-220 PO) Take by mouth     No Known  "Allergies    There is no immunization history on file for this patient.    Objective     /60   Pulse (!) 51   Temp 97.8 °F (36.6 °C)   Ht 5' 7\" (1.702 m)   Wt 83.5 kg (184 lb)   SpO2 100%   BMI 28.82 kg/m²     Physical Exam  Vitals and nursing note reviewed.   Constitutional:       General: He is not in acute distress.     Appearance: Normal appearance.   HENT:      Head: Normocephalic and atraumatic.      Nose: Nose normal.      Mouth/Throat:      Mouth: Mucous membranes are moist.      Pharynx: Oropharynx is clear. No oropharyngeal exudate or posterior oropharyngeal erythema.   Eyes:      Pupils: Pupils are equal, round, and reactive to light.   Cardiovascular:      Rate and Rhythm: Bradycardia present. Rhythm irregular.      Heart sounds: Normal heart sounds.   Pulmonary:      Effort: Pulmonary effort is normal. No respiratory distress.      Breath sounds: Normal breath sounds. No wheezing, rhonchi or rales.   Musculoskeletal:         General: Normal range of motion.      Cervical back: Normal range of motion and neck supple.   Skin:     General: Skin is warm and dry.   Neurological:      Mental Status: He is alert and oriented to person, place, and time.   Psychiatric:         Mood and Affect: Mood and affect normal.       Nicholas Clemens PA-C    "

## 2024-01-26 ENCOUNTER — TELEPHONE (OUTPATIENT)
Dept: CARDIOLOGY CLINIC | Facility: CLINIC | Age: 84
End: 2024-01-26

## 2024-01-29 ENCOUNTER — OFFICE VISIT (OUTPATIENT)
Dept: CARDIOLOGY CLINIC | Facility: CLINIC | Age: 84
End: 2024-01-29
Payer: COMMERCIAL

## 2024-01-29 VITALS
HEART RATE: 56 BPM | HEIGHT: 67 IN | BODY MASS INDEX: 29.19 KG/M2 | SYSTOLIC BLOOD PRESSURE: 156 MMHG | OXYGEN SATURATION: 99 % | RESPIRATION RATE: 16 BRPM | DIASTOLIC BLOOD PRESSURE: 60 MMHG | WEIGHT: 186 LBS

## 2024-01-29 DIAGNOSIS — I44.30 HEART BLOCK ATRIOVENTRICULAR: Primary | ICD-10-CM

## 2024-01-29 DIAGNOSIS — I49.8 AV NODE ARRHYTHMIA: ICD-10-CM

## 2024-01-29 PROCEDURE — 3078F DIAST BP <80 MM HG: CPT | Performed by: INTERNAL MEDICINE

## 2024-01-29 PROCEDURE — 3077F SYST BP >= 140 MM HG: CPT | Performed by: INTERNAL MEDICINE

## 2024-01-29 PROCEDURE — 1159F MED LIST DOCD IN RCRD: CPT | Performed by: INTERNAL MEDICINE

## 2024-01-29 PROCEDURE — 99213 OFFICE O/P EST LOW 20 MIN: CPT | Performed by: INTERNAL MEDICINE

## 2024-01-29 PROCEDURE — 1160F RVW MEDS BY RX/DR IN RCRD: CPT | Performed by: INTERNAL MEDICINE

## 2024-01-29 NOTE — PROGRESS NOTES
Cardiology Follow Up    Aurelio Thomas  1940  [id number]  [facility] [place] [id number] [phone]    1. Heart block atrioventricular  -     Holter monitor; Future; Expected date: 03/25/2024    2. AV node arrhythmia  -     Holter monitor; Future; Expected date: 03/25/2024          Interval History: 83-year-old  with second-degree heart block Mobitz 1.  He has no history of cardiac disease.  He can walk 25 to 50 minutes without symptoms.  He denies lightheadedness and passing out.    Event monitor showed varying degrees of heart block with pauses during sleep of up to 3.2 seconds.    However, on the treadmill he appropriately increased his heart rate to 113 and completed 6.1 METS of activity.    Patient Active Problem List   Diagnosis    Primary hypertension    Stage 3a chronic kidney disease (HCC)    Cardiac murmur    Second degree AV block, Mobitz type I     Past Medical History:   Diagnosis Date    Kidney stone      Social History     Socioeconomic History    Marital status:      Spouse name: Not on file    Number of children: Not on file    Years of education: Not on file    Highest education level: Not on file   Occupational History    Not on file   Tobacco Use    Smoking status: Never     Passive exposure: Never    Smokeless tobacco: Never   Substance and Sexual Activity    Alcohol use: Not on file    Drug use: Not on file    Sexual activity: Not on file   Other Topics Concern    Not on file   Social History Narrative    Not on file     Social Determinants of Health     Financial Resource Strain: Low Risk  (5/16/2023)    Overall Financial Resource Strain (CARDIA)     Difficulty of Paying Living Expenses: Not hard at all   Food Insecurity: Not on file   Transportation Needs: No Transportation Needs (5/16/2023)    PRAPARE - Transportation     Lack of Transportation  (Medical): No     Lack of Transportation (Non-Medical): No   Physical Activity: Not on file   Stress: Not on file   Social Connections: Not on file   Intimate Partner Violence: Not on file   Housing Stability: Not on file      Family History   Problem Relation Age of Onset    Hypertension Mother     Hypertension Father     Parkinsonism Father      History reviewed. No pertinent surgical history.    Current Outpatient Medications:     amLODIPine (NORVASC) 5 mg tablet, Take 2 tablets (10 mg total) by mouth daily, Disp: 180 tablet, Rfl: 3    bimatoprost (LUMIGAN) 0.03 % ophthalmic drops, , Disp: , Rfl:     Cholecalciferol (Vitamin D3) 1.25 MG (66246 UT) CAPS, Take by mouth, Disp: , Rfl:     Chromium Picolinate (CHROMIUM PICOLATE PO), Take by mouth, Disp: , Rfl:     CINNAMON PO, Take by mouth, Disp: , Rfl:     Coenzyme Q10 (COQ10 PO), Take by mouth, Disp: , Rfl:     GARLIC 1500 PO, Take by mouth, Disp: , Rfl:     hydrochlorothiazide (HYDRODIURIL) 25 mg tablet, Take 1 tablet (25 mg total) by mouth daily, Disp: 90 tablet, Rfl: 0    losartan (COZAAR) 100 MG tablet, Take 1 tablet (100 mg total) by mouth daily, Disp: 90 tablet, Rfl: 1    vitamin B-12 (VITAMIN B-12) 1,000 mcg tablet, Take by mouth daily, Disp: , Rfl:     Zinc Sulfate (ZINC-220 PO), Take by mouth, Disp: , Rfl:     Emollient (DHEA EX), Apply topically (Patient not taking: Reported on 5/30/2023), Disp: , Rfl:   No Known Allergies    Labs:  Appointment on 12/22/2023   Component Date Value    WBC 12/22/2023 5.54     RBC 12/22/2023 4.27     Hemoglobin 12/22/2023 13.0     Hematocrit 12/22/2023 38.4     MCV 12/22/2023 90     MCH 12/22/2023 30.4     MCHC 12/22/2023 33.9     RDW 12/22/2023 12.1     MPV 12/22/2023 10.2     Platelets 12/22/2023 253     nRBC 12/22/2023 0     Neutrophils Relative 12/22/2023 56     Immat GRANS % 12/22/2023 0     Lymphocytes Relative 12/22/2023 27     Monocytes Relative 12/22/2023 11     Eosinophils Relative 12/22/2023 5     Basophils  Relative 12/22/2023 1     Neutrophils Absolute 12/22/2023 3.08     Immature Grans Absolute 12/22/2023 0.01     Lymphocytes Absolute 12/22/2023 1.52     Monocytes Absolute 12/22/2023 0.59     Eosinophils Absolute 12/22/2023 0.27     Basophils Absolute 12/22/2023 0.07     Sodium 12/22/2023 137     Potassium 12/22/2023 4.7     Chloride 12/22/2023 102     CO2 12/22/2023 26     ANION GAP 12/22/2023 9     BUN 12/22/2023 35 (H)     Creatinine 12/22/2023 1.50 (H)     Glucose, Fasting 12/22/2023 97     Calcium 12/22/2023 9.7     AST 12/22/2023 17     ALT 12/22/2023 18     Alkaline Phosphatase 12/22/2023 61     Total Protein 12/22/2023 7.2     Albumin 12/22/2023 4.2     Total Bilirubin 12/22/2023 0.60     eGFR 12/22/2023 42     Creatinine, Ur 12/22/2023 72.6     Protein Urine Random 12/22/2023 6     Prot/Creat Ratio, Ur 12/22/2023 0.08     PTH 12/22/2023 48.7     Color, UA 12/22/2023 Light Yellow     Clarity, UA 12/22/2023 Clear     Specific Gravity, UA 12/22/2023 1.013     pH, UA 12/22/2023 5.5     Leukocytes, UA 12/22/2023 Negative     Nitrite, UA 12/22/2023 Negative     Protein, UA 12/22/2023 Negative     Glucose, UA 12/22/2023 Negative     Ketones, UA 12/22/2023 Negative     Urobilinogen, UA 12/22/2023 <2.0     Bilirubin, UA 12/22/2023 Negative     Occult Blood, UA 12/22/2023 Negative     RBC, UA 12/22/2023 1-2     WBC, UA 12/22/2023 None Seen     Epithelial Cells 12/22/2023 None Seen     Bacteria, UA 12/22/2023 None Seen     Vit D, 25-Hydroxy 12/22/2023 55.6      Imaging: No results found.    Review of Systems:  Review of Systems    Physical Exam:  Blood pressure 156/60.  Heart rate 56 and slightly irregular.  Lungs clear.  Carotids 2+ without bruits.  Regular rhythm.  Grade 2 systolic ejection murmur at the base that is short.  No edema.    Discussion/Summary:    1.  Mobitz 1 AV block of varying degrees without symptoms    Recommendations:    1.  Call if symptoms of lightheadedness  2.  Repeat Holter in 3 months  3.   Return for follow-up in 4 months.    Hernan Loyd MD

## 2024-02-05 ENCOUNTER — TELEPHONE (OUTPATIENT)
Dept: NEPHROLOGY | Facility: CLINIC | Age: 84
End: 2024-02-05

## 2024-02-05 NOTE — TELEPHONE ENCOUNTER
I called and left message on patients answering machine confirming appt for tomorrow tuesday 02/05/2024 with Dr. Saba

## 2024-02-06 ENCOUNTER — OFFICE VISIT (OUTPATIENT)
Dept: NEPHROLOGY | Facility: CLINIC | Age: 84
End: 2024-02-06
Payer: COMMERCIAL

## 2024-02-06 VITALS
DIASTOLIC BLOOD PRESSURE: 64 MMHG | TEMPERATURE: 97.8 F | WEIGHT: 185.6 LBS | OXYGEN SATURATION: 98 % | HEART RATE: 88 BPM | RESPIRATION RATE: 18 BRPM | SYSTOLIC BLOOD PRESSURE: 148 MMHG | BODY MASS INDEX: 29.13 KG/M2 | HEIGHT: 67 IN

## 2024-02-06 DIAGNOSIS — N18.32 STAGE 3B CHRONIC KIDNEY DISEASE (HCC): Primary | ICD-10-CM

## 2024-02-06 PROCEDURE — 99214 OFFICE O/P EST MOD 30 MIN: CPT | Performed by: INTERNAL MEDICINE

## 2024-02-06 NOTE — PROGRESS NOTES
NEPHROLOGY PROGRESS NOTE    Patient: Aurelio Thomas               Sex: male          DOA: No admission date for patient encounter.   YOB: 1940        Age:  83 y.o.       2/6/2024        BACKGROUND     83 years old male with past medical history of hypertension, chronic kidney disease stage IIIb, excessive alcohol use who has been following up with the renal office since June 2023.    SUBJECTIVE   \    Patient presents to renal office after 8 months.  States that he drinks alcohol only on the weekends.  He does not check his blood pressure at home and states checks it at doctor's office.  2 more blood pressure medications have been added to his regimen including hydrochlorothiazide and losartan.  Patient has been seeing a cardiologist given type II heart block.  This finding was achieved after patient event Holter monitoring.  States that he has increased his fluid intake on a daily basis.    REVIEW OF SYSTEMS     Review of Systems   Constitutional: Negative.    HENT: Negative.     Eyes: Negative.    Respiratory: Negative.     Cardiovascular: Negative.    Gastrointestinal: Negative.    Endocrine: Negative.    Genitourinary: Negative.    Musculoskeletal: Negative.    Skin: Negative.    Allergic/Immunologic: Negative.    Neurological: Negative.    Hematological: Negative.    All other systems reviewed and are negative.      OBJECTIVE     Current Weight: Weight - Scale: 84.2 kg (185 lb 9.6 oz)  Vitals:    02/06/24 1126   BP: 148/64   Pulse: 88   Resp: 18   Temp: 97.8 °F (36.6 °C)   SpO2: 98%     Body mass index is 29.07 kg/m².      CURRENT MEDICATIONS       Current Outpatient Medications:     amLODIPine (NORVASC) 5 mg tablet, Take 2 tablets (10 mg total) by mouth daily, Disp: 180 tablet, Rfl: 3    bimatoprost (LUMIGAN) 0.03 % ophthalmic drops, , Disp: , Rfl:     Cholecalciferol (Vitamin D3) 1.25 MG (17061 UT) CAPS, Take by mouth, Disp: , Rfl:     Chromium Picolinate (CHROMIUM PICOLATE PO), Take by  mouth, Disp: , Rfl:     CINNAMON PO, Take by mouth, Disp: , Rfl:     Coenzyme Q10 (COQ10 PO), Take by mouth, Disp: , Rfl:     Emollient (DHEA EX), Apply topically, Disp: , Rfl:     GARLIC 1500 PO, Take by mouth, Disp: , Rfl:     hydrochlorothiazide (HYDRODIURIL) 25 mg tablet, Take 1 tablet (25 mg total) by mouth daily, Disp: 90 tablet, Rfl: 0    losartan (COZAAR) 100 MG tablet, Take 1 tablet (100 mg total) by mouth daily, Disp: 90 tablet, Rfl: 1    vitamin B-12 (VITAMIN B-12) 1,000 mcg tablet, Take by mouth daily, Disp: , Rfl:     Zinc Sulfate (ZINC-220 PO), Take by mouth, Disp: , Rfl:       PHYSICAL EXAMINATION     Physical Exam  HENT:      Head: Normocephalic and atraumatic.   Eyes:      Pupils: Pupils are equal, round, and reactive to light.   Neck:      Vascular: No JVD.   Cardiovascular:      Rate and Rhythm: Normal rate and regular rhythm.      Heart sounds: Normal heart sounds. No murmur heard.     No friction rub.   Pulmonary:      Effort: Pulmonary effort is normal.      Breath sounds: Normal breath sounds.   Abdominal:      General: Bowel sounds are normal. There is no distension.      Palpations: Abdomen is soft.      Tenderness: There is no abdominal tenderness. There is no rebound.   Musculoskeletal:         General: No tenderness.      Cervical back: Neck supple.   Skin:     General: Skin is dry.      Findings: No rash.   Neurological:      Mental Status: He is alert and oriented to person, place, and time.           LAB RESULTS     Results from last 6 Months   Lab Units 12/22/23  0906 11/10/23  1118 11/07/23  1154 10/27/23  1016 09/01/23  1029   WBC Thousand/uL 5.54  --   --   --  4.45   HEMOGLOBIN g/dL 13.0  --   --   --  13.5   HEMATOCRIT % 38.4  --   --   --  40.7   PLATELETS Thousands/uL 253  --   --   --  216   POTASSIUM mmol/L 4.7 4.3 4.2   < > 3.9   CHLORIDE mmol/L 102 99 101   < > 104   CO2 mmol/L 26 27 27   < > 25   BUN mg/dL 35* 26* 37*   < > 22   CREATININE mg/dL 1.50* 1.39* 1.56*   < >  1.26   CALCIUM mg/dL 9.7 9.2 9.1   < > 9.3   PHOSPHORUS mg/dL 3.7  --   --   --   --     < > = values in this interval not displayed.             RADIOLOGY RESULTS      Pending  ASSESSMENT/PLAN     83 years old male with past medical history of hypertension, chronic kidney disease stage IIIb, type II heart block, excessive alcohol use who presents to renal office for management of CKD.    1.  Chronic kidney disease stage IIIb: Baseline creatinine has ranged anywhere between 1.3-1.5.  Current serum creatinine is 1.5 and within range.  Etiology of CKD is age-related nephron loss, excessive alcohol use in his lifetime as well as hypertensive nephrosclerosis.  Ultrasound is pending.  Urinalysis does not reveal any proteinuria.    2.  Hypertension due to CKD: Blood pressure is above target at 148/64 in the office.  Instructed patient to check his blood pressure at home with target blood pressure less than 140/90.  Remains on amlodipine 10 mg daily, hydrochlorothiazide 25 mg daily and losartan 100 mg daily.  Patient is to avoid AV jessica blocking agents given type II heart block.    3.  Proteinuria: Not detected.    4.  Bone mineral disorder: Normal calcium, phosphorus and 25-hydroxy vitamin D levels were noted.    5.  Nutrition: 64 ounces of water, low-salt diet moderate potassium diet recommended            Alvaro Saba MD  Nephrology  2/6/2024

## 2024-02-13 PROBLEM — I12.9 HYPERTENSIVE KIDNEY DISEASE: Status: ACTIVE | Noted: 2024-02-13

## 2024-02-20 ENCOUNTER — HOSPITAL ENCOUNTER (OUTPATIENT)
Dept: ULTRASOUND IMAGING | Facility: HOSPITAL | Age: 84
Discharge: HOME | End: 2024-02-20
Attending: INTERNAL MEDICINE
Payer: COMMERCIAL

## 2024-02-20 DIAGNOSIS — N18.31 STAGE 3A CHRONIC KIDNEY DISEASE (HCC): ICD-10-CM

## 2024-02-20 PROCEDURE — 76775 US EXAM ABDO BACK WALL LIM: CPT

## 2024-03-27 ENCOUNTER — HOSPITAL ENCOUNTER (OUTPATIENT)
Dept: NON INVASIVE DIAGNOSTICS | Facility: CLINIC | Age: 84
Discharge: HOME | End: 2024-03-27
Payer: COMMERCIAL

## 2024-03-27 DIAGNOSIS — I49.8 AV NODE ARRHYTHMIA: ICD-10-CM

## 2024-03-27 DIAGNOSIS — I44.30 HEART BLOCK ATRIOVENTRICULAR: ICD-10-CM

## 2024-03-27 PROCEDURE — 93226 XTRNL ECG REC<48 HR SCAN A/R: CPT

## 2024-03-27 PROCEDURE — 93225 XTRNL ECG REC<48 HRS REC: CPT

## 2024-04-03 PROCEDURE — 93227 XTRNL ECG REC<48 HR R&I: CPT | Performed by: INTERNAL MEDICINE

## 2024-04-18 DIAGNOSIS — I10 PRIMARY HYPERTENSION: ICD-10-CM

## 2024-04-18 RX ORDER — HYDROCHLOROTHIAZIDE 25 MG/1
25 TABLET ORAL DAILY
Qty: 90 TABLET | Refills: 0 | Status: SHIPPED | OUTPATIENT
Start: 2024-04-18

## 2024-05-21 ENCOUNTER — TELEPHONE (OUTPATIENT)
Age: 84
End: 2024-05-21

## 2024-05-21 DIAGNOSIS — L84 CALLUS OF FOOT: Primary | ICD-10-CM

## 2024-05-21 NOTE — TELEPHONE ENCOUNTER
Pt called back and pt was advised on referral. Provided pt with MD's name, Address and phone number.     [facility]

## 2024-05-21 NOTE — TELEPHONE ENCOUNTER
Patient has a crack in the callus on his left foot and would like a referral to a foot doctor because he is having a hard time walking because of it . Please advise patient when/if done

## 2024-06-03 ENCOUNTER — TELEPHONE (OUTPATIENT)
Dept: FAMILY MEDICINE CLINIC | Facility: CLINIC | Age: 84
End: 2024-06-03

## 2024-06-03 NOTE — TELEPHONE ENCOUNTER
Pt stopped in to ask you a question.  He had appt with the [facility] and they gave him a script for US Arterial Doppler b/l left leg.  They told him to check with his family doctor to see if it is ok to have done.  Please advise pt.  Pt aware you are not in today.

## 2024-07-17 ENCOUNTER — RA CDI HCC (OUTPATIENT)
Dept: OTHER | Facility: HOSPITAL | Age: 84
End: 2024-07-17

## 2024-07-17 DIAGNOSIS — I10 PRIMARY HYPERTENSION: ICD-10-CM

## 2024-07-17 RX ORDER — AMLODIPINE BESYLATE 5 MG/1
10 TABLET ORAL DAILY
Qty: 180 TABLET | Refills: 1 | Status: SHIPPED | OUTPATIENT
Start: 2024-07-17

## 2024-07-17 NOTE — TELEPHONE ENCOUNTER
Reason for call:   [x] Refill   [] Prior Auth  [] Other:     Office:   [] PCP/Provider -   [x] Specialty/Provider - Nephrology - Dr Saba     Medication: amLODIPine (NORVASC)     Dose/Frequency: 5 mg, 10 mg daily     Quantity: 200    Pharmacy: [id number]    Does the patient have enough for 3 days?   [] Yes   [x] No - Send as HP to POD

## 2024-07-18 ENCOUNTER — TELEPHONE (OUTPATIENT)
Age: 84
End: 2024-07-18

## 2024-07-18 DIAGNOSIS — Z13.220 SCREENING FOR LIPID DISORDERS: ICD-10-CM

## 2024-07-18 DIAGNOSIS — I10 PRIMARY HYPERTENSION: Primary | ICD-10-CM

## 2024-07-23 ENCOUNTER — APPOINTMENT (OUTPATIENT)
Dept: LAB | Facility: CLINIC | Age: 84
End: 2024-07-23
Payer: COMMERCIAL

## 2024-07-23 DIAGNOSIS — I10 PRIMARY HYPERTENSION: ICD-10-CM

## 2024-07-23 DIAGNOSIS — Z13.220 SCREENING FOR LIPID DISORDERS: ICD-10-CM

## 2024-07-23 LAB
ALBUMIN SERPL BCG-MCNC: 4.2 G/DL (ref 3.5–5)
ALP SERPL-CCNC: 52 U/L (ref 34–104)
ALT SERPL W P-5'-P-CCNC: 18 U/L (ref 7–52)
ANION GAP SERPL CALCULATED.3IONS-SCNC: 7 MMOL/L (ref 4–13)
AST SERPL W P-5'-P-CCNC: 17 U/L (ref 13–39)
BILIRUB SERPL-MCNC: 0.72 MG/DL (ref 0.2–1)
BUN SERPL-MCNC: 42 MG/DL (ref 5–25)
CALCIUM SERPL-MCNC: 9.2 MG/DL (ref 8.4–10.2)
CHLORIDE SERPL-SCNC: 104 MMOL/L (ref 96–108)
CHOLEST SERPL-MCNC: 191 MG/DL
CO2 SERPL-SCNC: 25 MMOL/L (ref 21–32)
CREAT SERPL-MCNC: 1.66 MG/DL (ref 0.6–1.3)
GFR SERPL CREATININE-BSD FRML MDRD: 37 ML/MIN/1.73SQ M
GLUCOSE P FAST SERPL-MCNC: 106 MG/DL (ref 65–99)
HDLC SERPL-MCNC: 47 MG/DL
LDLC SERPL CALC-MCNC: 128 MG/DL (ref 0–100)
POTASSIUM SERPL-SCNC: 4.4 MMOL/L (ref 3.5–5.3)
PROT SERPL-MCNC: 7.1 G/DL (ref 6.4–8.4)
SODIUM SERPL-SCNC: 136 MMOL/L (ref 135–147)
TRIGL SERPL-MCNC: 79 MG/DL

## 2024-07-23 PROCEDURE — 80053 COMPREHEN METABOLIC PANEL: CPT

## 2024-07-23 PROCEDURE — 36415 COLL VENOUS BLD VENIPUNCTURE: CPT

## 2024-07-23 PROCEDURE — 80061 LIPID PANEL: CPT

## 2024-07-25 ENCOUNTER — OFFICE VISIT (OUTPATIENT)
Dept: FAMILY MEDICINE CLINIC | Facility: CLINIC | Age: 84
End: 2024-07-25
Payer: COMMERCIAL

## 2024-07-25 VITALS
HEART RATE: 48 BPM | OXYGEN SATURATION: 97 % | BODY MASS INDEX: 29.63 KG/M2 | TEMPERATURE: 97.5 F | HEIGHT: 67 IN | SYSTOLIC BLOOD PRESSURE: 160 MMHG | WEIGHT: 188.8 LBS | DIASTOLIC BLOOD PRESSURE: 60 MMHG

## 2024-07-25 DIAGNOSIS — I12.9 HYPERTENSIVE KIDNEY DISEASE: ICD-10-CM

## 2024-07-25 DIAGNOSIS — N18.31 STAGE 3A CHRONIC KIDNEY DISEASE (HCC): ICD-10-CM

## 2024-07-25 DIAGNOSIS — I44.1 SECOND DEGREE AV BLOCK, MOBITZ TYPE I: ICD-10-CM

## 2024-07-25 DIAGNOSIS — Z00.00 MEDICARE ANNUAL WELLNESS VISIT, SUBSEQUENT: ICD-10-CM

## 2024-07-25 DIAGNOSIS — I10 PRIMARY HYPERTENSION: Primary | ICD-10-CM

## 2024-07-25 PROCEDURE — 99214 OFFICE O/P EST MOD 30 MIN: CPT | Performed by: PHYSICIAN ASSISTANT

## 2024-07-25 PROCEDURE — G0439 PPPS, SUBSEQ VISIT: HCPCS | Performed by: PHYSICIAN ASSISTANT

## 2024-07-25 RX ORDER — HYDRALAZINE HYDROCHLORIDE 10 MG/1
10 TABLET, FILM COATED ORAL 3 TIMES DAILY
Qty: 90 TABLET | Refills: 5 | Status: SHIPPED | OUTPATIENT
Start: 2024-07-25

## 2024-07-25 NOTE — PROGRESS NOTES
Ambulatory Visit  Name: Aurelio Thomas      : 1940      MRN: [id number]  Encounter Provider: Nicholas Clemens PA-C  Encounter Date: 2024   Encounter department: [facility]    Assessment & Plan   1. Primary hypertension  -     hydrALAZINE (APRESOLINE) 10 mg tablet; Take 1 tablet (10 mg total) by mouth 3 (three) times a day  2. Second degree AV block, Mobitz type I  3. Stage 3a chronic kidney disease (HCC)  4. Hypertensive kidney disease  5. Medicare annual wellness visit, subsequent    BP remains above goal, in setting of hypertensive CKD. Add hydralazine. Avoid AV jessica blocking agents. Continue amlodipine, hctz and losartan. Follow up cardiology, appreciate input. Continue with nephrology. Avoid nsaids. Stay well hydrated. Labs reviewed. 6 month follow up, earlier prn    BMI Counseling: Body mass index is 29.57 kg/m². The BMI is above normal. Exercise recommendations include strength training exercises.       Preventive health issues were discussed with patient, and age appropriate screening tests were ordered as noted in patient's After Visit Summary. Personalized health advice and appropriate referrals for health education or preventive services given if needed, as noted in patient's After Visit Summary.    History of Present Illness     Pt presents for follow up    HTN: resistant, on max dose losartan, amlodipine and hctz. In setting of CKD. Not at goal. No end organ complaints  AV block, 2nd degree mobitz I: follows with cardiology. No pacer, no syncope or lightheadedness.   Labs as below    Recent Results (from the past 672 hour(s))  -Comprehensive metabolic panel:   Collection Time: 24 11:46 AM       Result                      Value             Ref Range           Sodium                      136               135 - 147 mm*       Potassium                   4.4               3.5 - 5.3 mm*       Chloride                    104               96 - 108 mmo*       CO2                          25                21 - 32 mmol*       ANION GAP                   7                 4 - 13 mmol/L       BUN                         42 (H)            5 - 25 mg/dL        Creatinine                  1.66 (H)          0.60 - 1.30 *       Glucose, Fasting            106 (H)           65 - 99 mg/dL       Calcium                     9.2               8.4 - 10.2 m*       AST                         17                13 - 39 U/L         ALT                         18                7 - 52 U/L          Alkaline Phosphatase        52                34 - 104 U/L        Total Protein               7.1               6.4 - 8.4 g/*       Albumin                     4.2               3.5 - 5.0 g/*       Total Bilirubin             0.72              0.20 - 1.00 *       eGFR                        37                ml/min/1.73s*  -Lipid Panel with Direct LDL reflex:   Collection Time: 07/23/24 11:46 AM       Result                      Value             Ref Range           Cholesterol                 191               See Comment *       Triglycerides               79                See Comment *       HDL, Direct                 47                >=40 mg/dL          LDL Calculated              128 (H)           0 - 100 mg/dL         Patient Care Team:  Nicholas Clemens PA-C as PCP - General (Family Medicine)  Alvaro Saba MD (Nephrology)    Review of Systems   Constitutional:  Negative for chills, fatigue and fever.   HENT:  Negative for congestion, ear pain, hearing loss, nosebleeds, postnasal drip, rhinorrhea, sinus pressure, sinus pain, sneezing and sore throat.    Eyes:  Negative for pain, discharge, itching and visual disturbance.   Respiratory:  Negative for cough, chest tightness, shortness of breath and wheezing.    Cardiovascular:  Negative for chest pain, palpitations and leg swelling.   Gastrointestinal:  Negative for abdominal pain, blood in stool, constipation, diarrhea, nausea and vomiting.    Genitourinary:  Negative for frequency and urgency.   Neurological:  Negative for dizziness, light-headedness and numbness.     Medical History Reviewed by provider this encounter:       Annual Wellness Visit Questionnaire   Aurelio is here for his Subsequent Wellness visit.     Health Risk Assessment:   Patient rates overall health as excellent. Patient feels that their physical health rating is same. Patient is very satisfied with their life. Eyesight was rated as same. Hearing was rated as same. Patient feels that their emotional and mental health rating is same. Patients states they are never, rarely angry. Patient states they are never, rarely unusually tired/fatigued. Pain experienced in the last 7 days has been none. Patient states that he has experienced no weight loss or gain in last 6 months.     Depression Screening:   PHQ-2 Score: 0      Fall Risk Screening:   In the past year, patient has experienced: no history of falling in past year      Home Safety:  Patient does not have trouble with stairs inside or outside of their home. Patient has working smoke alarms and has no working carbon monoxide detector. Home safety hazards include: none.     Nutrition:   Current diet is Regular. BMI Counseling: Body mass index is 29.57 kg/m². The BMI is above normal. Nutrition recommendations include 3-5 servings of fruits/vegetables daily, consuming healthier snacks and moderation in carbohydrate intake. Exercise recommendations include strength training exercises.      Medications:   Patient is currently taking over-the-counter supplements. OTC medications include: see medication list. Patient is able to manage medications.     Activities of Daily Living (ADLs)/Instrumental Activities of Daily Living (IADLs):   Walk and transfer into and out of bed and chair?: Yes  Dress and groom yourself?: Yes    Bathe or shower yourself?: Yes    Feed yourself? Yes  Do your laundry/housekeeping?: Yes  Manage your money, pay your  bills and track your expenses?: Yes  Make your own meals?: Yes    Do your own shopping?: Yes    Previous Hospitalizations:   Any hospitalizations or ED visits within the last 12 months?: No      Advance Care Planning:   Living will: Yes    Durable POA for healthcare: Yes    Advanced directive: Yes    Advanced directive counseling given: No    Five wishes given: No      PREVENTIVE SCREENINGS      Cardiovascular Screening:    General: Screening Current      Diabetes Screening:     General: Screening Current    Due for: Blood Glucose      Colorectal Cancer Screening:     General: Screening Not Indicated      Prostate Cancer Screening:    General: Screening Not Indicated      Osteoporosis Screening:    General: Screening Not Indicated      Abdominal Aortic Aneurysm (AAA) Screening:        General: Screening Not Indicated      Lung Cancer Screening:     General: Screening Not Indicated      Hepatitis C Screening:    General: Screening Not Indicated    Screening, Brief Intervention, and Referral to Treatment (SBIRT)    Screening  Typical number of drinks in a day: 2  Typical number of drinks in a week: 4  Interpretation: Low risk drinking behavior.    Single Item Drug Screening:  How often have you used an illegal drug (including marijuana) or a prescription medication for non-medical reasons in the past year? never    Single Item Drug Screen Score: 0  Interpretation: Negative screen for possible drug use disorder    Social Determinants of Health     Financial Resource Strain: Low Risk  (5/16/2023)    Overall Financial Resource Strain (CARDIA)     Difficulty of Paying Living Expenses: Not hard at all   Food Insecurity: No Food Insecurity (7/25/2024)    Hunger Vital Sign     Worried About Running Out of Food in the Last Year: Never true     Ran Out of Food in the Last Year: Never true   Transportation Needs: No Transportation Needs (7/25/2024)    PRAPARE - Transportation     Lack of Transportation (Medical): No     Lack of  "Transportation (Non-Medical): No   Housing Stability: Unknown (7/25/2024)    Housing Stability Vital Sign     Unable to Pay for Housing in the Last Year: No   Utilities: Not At Risk (7/25/2024)    [facility] Utilities     Threatened with loss of utilities: No     No results found.    Objective     /60   Pulse (!) 48   Temp 97.5 °F (36.4 °C)   Ht 5' 7\" (1.702 m)   Wt 85.6 kg (188 lb 12.8 oz)   SpO2 97%   BMI 29.57 kg/m²     Physical Exam  Vitals and nursing note reviewed.   Constitutional:       General: He is not in acute distress.     Appearance: He is well-developed.   HENT:      Head: Normocephalic and atraumatic.   Eyes:      Conjunctiva/sclera: Conjunctivae normal.   Cardiovascular:      Rate and Rhythm: Normal rate and regular rhythm.      Heart sounds: No murmur heard.  Pulmonary:      Effort: Pulmonary effort is normal. No respiratory distress.      Breath sounds: Normal breath sounds. No wheezing, rhonchi or rales.   Abdominal:      Palpations: Abdomen is soft.      Tenderness: There is no abdominal tenderness.   Musculoskeletal:         General: No swelling.      Cervical back: Neck supple.   Skin:     General: Skin is warm and dry.      Capillary Refill: Capillary refill takes less than 2 seconds.   Neurological:      Mental Status: He is alert.           "

## 2024-07-31 DIAGNOSIS — N18.31 STAGE 3A CHRONIC KIDNEY DISEASE (HCC): ICD-10-CM

## 2024-07-31 DIAGNOSIS — I10 PRIMARY HYPERTENSION: ICD-10-CM

## 2024-07-31 RX ORDER — HYDROCHLOROTHIAZIDE 25 MG/1
25 TABLET ORAL DAILY
Qty: 100 TABLET | Refills: 1 | Status: SHIPPED | OUTPATIENT
Start: 2024-07-31

## 2024-07-31 RX ORDER — LOSARTAN POTASSIUM 100 MG/1
100 TABLET ORAL DAILY
Qty: 100 TABLET | Refills: 1 | Status: SHIPPED | OUTPATIENT
Start: 2024-07-31 | End: 2025-01-27

## 2024-07-31 NOTE — TELEPHONE ENCOUNTER
Reason for call:   [x] Refill   [] Prior Auth  [] Other:     Office:   [x] PCP/Provider - Nicholas Clemens PA-C  [] Specialty/Provider -     Medication:   hydroCHLOROthiazide 25 mg/TAKE ONE TABLET BY MOUTH ONE TIME DAILY     losartan (COZAAR) 100 MG/Take 1 tablet (100 mg total) by mouth daily     Quantity: 100    Pharmacy: [id number] - [place] - [address]     Does the patient have enough for 3 days?   [] Yes   [x] No - Send as HP to POD

## 2024-08-08 ENCOUNTER — OFFICE VISIT (OUTPATIENT)
Dept: FAMILY MEDICINE CLINIC | Facility: CLINIC | Age: 84
End: 2024-08-08
Payer: COMMERCIAL

## 2024-08-08 VITALS
HEART RATE: 65 BPM | WEIGHT: 186.2 LBS | SYSTOLIC BLOOD PRESSURE: 140 MMHG | HEIGHT: 67 IN | BODY MASS INDEX: 29.22 KG/M2 | OXYGEN SATURATION: 94 % | DIASTOLIC BLOOD PRESSURE: 66 MMHG | TEMPERATURE: 97.8 F

## 2024-08-08 DIAGNOSIS — R09.82 PND (POST-NASAL DRIP): Primary | ICD-10-CM

## 2024-08-08 DIAGNOSIS — J34.2 DEVIATED SEPTUM: ICD-10-CM

## 2024-08-08 DIAGNOSIS — R09.81 CHRONIC NASAL CONGESTION: ICD-10-CM

## 2024-08-08 DIAGNOSIS — L57.0 AK (ACTINIC KERATOSIS): ICD-10-CM

## 2024-08-08 DIAGNOSIS — I10 PRIMARY HYPERTENSION: ICD-10-CM

## 2024-08-08 PROCEDURE — 99214 OFFICE O/P EST MOD 30 MIN: CPT | Performed by: PHYSICIAN ASSISTANT

## 2024-08-08 PROCEDURE — G2211 COMPLEX E/M VISIT ADD ON: HCPCS | Performed by: PHYSICIAN ASSISTANT

## 2024-08-08 RX ORDER — FLUTICASONE PROPIONATE 50 MCG
2 SPRAY, SUSPENSION (ML) NASAL DAILY
Qty: 11.1 ML | Refills: 1 | Status: SHIPPED | OUTPATIENT
Start: 2024-08-08

## 2024-08-08 RX ORDER — FLUTICASONE PROPIONATE 50 MCG
1 SPRAY, SUSPENSION (ML) NASAL DAILY
Qty: 11.1 ML | Refills: 1 | Status: SHIPPED | OUTPATIENT
Start: 2024-08-08 | End: 2024-08-08

## 2024-08-08 NOTE — PROGRESS NOTES
Ambulatory Visit  Name: Aurelio Thomas      : 1940      MRN: [id number]  Encounter Provider: Nicholas Clemens PA-C  Encounter Date: 2024   Encounter department: [facility]    Assessment & Plan   1. PND (post-nasal drip)  -     Ambulatory Referral to Otolaryngology; Future  -     fluticasone (FLONASE) 50 mcg/act nasal spray; 2 sprays into each nostril daily  2. Deviated septum  -     Ambulatory Referral to Otolaryngology; Future  -     fluticasone (FLONASE) 50 mcg/act nasal spray; 2 sprays into each nostril daily  3. Chronic nasal congestion  -     Ambulatory Referral to Otolaryngology; Future  -     fluticasone (FLONASE) 50 mcg/act nasal spray; 2 sprays into each nostril daily  4. AK (actinic keratosis)  -     Ambulatory Referral to Dermatology; Future  5. Primary hypertension     Suspect throat pain 2/2 chronic PND and mouth breathing from deviated septum/chronic congestion. Use flonase, wean afrin, refer to ENT.   Refer to dermatology  Of note BP is much improved with addition of hydralazine, continue current regimen  140/66 today     History of Present Illness     Pt presents with concerns of chronic throat pain. Several weeks. Notes chronic nasal congestion and PND. No heartburn. No dysphagia. No voice change. He uses afrin chronically for nasal congestion and PND. No fevers or other URI sx.     Also notes facial lesions on R cheek      Review of Systems   Constitutional:  Negative for chills, fatigue and fever.   HENT:  Positive for congestion, postnasal drip and sore throat. Negative for ear pain, hearing loss, nosebleeds, rhinorrhea, sinus pressure, sinus pain and sneezing.    Eyes:  Negative for pain, discharge, itching and visual disturbance.   Respiratory:  Negative for cough, chest tightness, shortness of breath and wheezing.    Cardiovascular:  Negative for chest pain, palpitations and leg swelling.   Gastrointestinal:  Negative for abdominal pain, blood in stool,  "constipation, diarrhea, nausea and vomiting.   Genitourinary:  Negative for frequency and urgency.   Neurological:  Negative for dizziness, light-headedness and numbness.     Past Medical History:   Diagnosis Date    Chronic kidney disease     Kidney stone      No past surgical history on file.  Family History   Problem Relation Age of Onset    Hypertension Mother     Hypertension Father     Parkinsonism Father      Social History     Tobacco Use    Smoking status: Never     Passive exposure: Never    Smokeless tobacco: Never   Substance and Sexual Activity    Alcohol use: Yes     Comment: socially    Drug use: Never    Sexual activity: Not Currently     Partners: Female     Current Outpatient Medications on File Prior to Visit   Medication Sig    amLODIPine (NORVASC) 5 mg tablet Take 2 tablets (10 mg total) by mouth daily    bimatoprost (LUMIGAN) 0.03 % ophthalmic drops     Cholecalciferol (Vitamin D3) 1.25 MG (34502 UT) CAPS Take by mouth    Chromium Picolinate (CHROMIUM PICOLATE PO) Take by mouth    CINNAMON PO Take by mouth    Coenzyme Q10 (COQ10 PO) Take by mouth    Emollient (DHEA EX) Apply topically    GARLIC 1500 PO Take by mouth    hydrALAZINE (APRESOLINE) 10 mg tablet Take 1 tablet (10 mg total) by mouth 3 (three) times a day    hydroCHLOROthiazide 25 mg tablet Take 1 tablet (25 mg total) by mouth daily    losartan (COZAAR) 100 MG tablet Take 1 tablet (100 mg total) by mouth daily    vitamin B-12 (VITAMIN B-12) 1,000 mcg tablet Take by mouth daily    Zinc Sulfate (ZINC-220 PO) Take by mouth     No Known Allergies    There is no immunization history on file for this patient.  Objective     /66   Pulse 65   Temp 97.8 °F (36.6 °C)   Ht 5' 7\" (1.702 m)   Wt 84.5 kg (186 lb 3.2 oz)   SpO2 94%   BMI 29.16 kg/m²     Physical Exam  Vitals and nursing note reviewed.   Constitutional:       General: He is not in acute distress.     Appearance: He is well-developed.   HENT:      Head: Normocephalic and " atraumatic.      Nose: Congestion present.      Comments: Septal deviation     Mouth/Throat:      Mouth: Mucous membranes are moist.      Pharynx: Oropharynx is clear. No oropharyngeal exudate or posterior oropharyngeal erythema.      Comments: PND noted  Eyes:      Conjunctiva/sclera: Conjunctivae normal.   Cardiovascular:      Rate and Rhythm: Normal rate and regular rhythm.      Heart sounds: No murmur heard.  Pulmonary:      Effort: Pulmonary effort is normal. No respiratory distress.      Breath sounds: Normal breath sounds. No wheezing, rhonchi or rales.   Musculoskeletal:         General: No swelling.      Cervical back: Neck supple.   Skin:     General: Skin is warm and dry.      Capillary Refill: Capillary refill takes less than 2 seconds.   Neurological:      Mental Status: He is alert.   Psychiatric:         Mood and Affect: Mood normal.

## 2024-08-28 ENCOUNTER — APPOINTMENT (OUTPATIENT)
Dept: LAB | Facility: CLINIC | Age: 84
End: 2024-08-28
Payer: COMMERCIAL

## 2024-08-28 DIAGNOSIS — N18.31 STAGE 3A CHRONIC KIDNEY DISEASE (HCC): ICD-10-CM

## 2024-08-28 DIAGNOSIS — N18.32 STAGE 3B CHRONIC KIDNEY DISEASE (HCC): ICD-10-CM

## 2024-08-28 LAB
25(OH)D3 SERPL-MCNC: 47.3 NG/ML (ref 30–100)
ALBUMIN SERPL BCG-MCNC: 4.4 G/DL (ref 3.5–5)
ALP SERPL-CCNC: 56 U/L (ref 34–104)
ALT SERPL W P-5'-P-CCNC: 18 U/L (ref 7–52)
ANION GAP SERPL CALCULATED.3IONS-SCNC: 8 MMOL/L (ref 4–13)
AST SERPL W P-5'-P-CCNC: 18 U/L (ref 13–39)
BACTERIA UR QL AUTO: NORMAL /HPF
BASOPHILS # BLD AUTO: 0.05 THOUSANDS/ÂΜL (ref 0–0.1)
BASOPHILS NFR BLD AUTO: 1 % (ref 0–1)
BILIRUB SERPL-MCNC: 0.69 MG/DL (ref 0.2–1)
BILIRUB UR QL STRIP: NEGATIVE
BUN SERPL-MCNC: 33 MG/DL (ref 5–25)
CALCIUM SERPL-MCNC: 9.3 MG/DL (ref 8.4–10.2)
CHLORIDE SERPL-SCNC: 104 MMOL/L (ref 96–108)
CLARITY UR: CLEAR
CO2 SERPL-SCNC: 24 MMOL/L (ref 21–32)
COLOR UR: NORMAL
CREAT SERPL-MCNC: 1.48 MG/DL (ref 0.6–1.3)
CREAT UR-MCNC: 82.6 MG/DL
EOSINOPHIL # BLD AUTO: 0.24 THOUSAND/ÂΜL (ref 0–0.61)
EOSINOPHIL NFR BLD AUTO: 5 % (ref 0–6)
ERYTHROCYTE [DISTWIDTH] IN BLOOD BY AUTOMATED COUNT: 12.7 % (ref 11.6–15.1)
GFR SERPL CREATININE-BSD FRML MDRD: 42 ML/MIN/1.73SQ M
GLUCOSE P FAST SERPL-MCNC: 98 MG/DL (ref 65–99)
GLUCOSE UR STRIP-MCNC: NEGATIVE MG/DL
HCT VFR BLD AUTO: 38.8 % (ref 36.5–49.3)
HGB BLD-MCNC: 12.7 G/DL (ref 12–17)
HGB UR QL STRIP.AUTO: NEGATIVE
IMM GRANULOCYTES # BLD AUTO: 0.01 THOUSAND/UL (ref 0–0.2)
IMM GRANULOCYTES NFR BLD AUTO: 0 % (ref 0–2)
KETONES UR STRIP-MCNC: NEGATIVE MG/DL
LEUKOCYTE ESTERASE UR QL STRIP: NEGATIVE
LYMPHOCYTES # BLD AUTO: 1.31 THOUSANDS/ÂΜL (ref 0.6–4.47)
LYMPHOCYTES NFR BLD AUTO: 27 % (ref 14–44)
MCH RBC QN AUTO: 30.3 PG (ref 26.8–34.3)
MCHC RBC AUTO-ENTMCNC: 32.7 G/DL (ref 31.4–37.4)
MCV RBC AUTO: 93 FL (ref 82–98)
MONOCYTES # BLD AUTO: 0.55 THOUSAND/ÂΜL (ref 0.17–1.22)
MONOCYTES NFR BLD AUTO: 11 % (ref 4–12)
NEUTROPHILS # BLD AUTO: 2.74 THOUSANDS/ÂΜL (ref 1.85–7.62)
NEUTS SEG NFR BLD AUTO: 56 % (ref 43–75)
NITRITE UR QL STRIP: NEGATIVE
NON-SQ EPI CELLS URNS QL MICRO: NORMAL /HPF
NRBC BLD AUTO-RTO: 0 /100 WBCS
PH UR STRIP.AUTO: 6 [PH]
PHOSPHATE SERPL-MCNC: 3.3 MG/DL (ref 2.3–4.1)
PLATELET # BLD AUTO: 201 THOUSANDS/UL (ref 149–390)
PMV BLD AUTO: 10.4 FL (ref 8.9–12.7)
POTASSIUM SERPL-SCNC: 4.6 MMOL/L (ref 3.5–5.3)
PROT SERPL-MCNC: 7.3 G/DL (ref 6.4–8.4)
PROT UR STRIP-MCNC: NEGATIVE MG/DL
PROT UR-MCNC: 7.5 MG/DL
PROT/CREAT UR: 0.1 MG/G{CREAT} (ref 0–0.1)
PTH-INTACT SERPL-MCNC: 51.2 PG/ML (ref 12–88)
RBC # BLD AUTO: 4.19 MILLION/UL (ref 3.88–5.62)
RBC #/AREA URNS AUTO: NORMAL /HPF
SODIUM SERPL-SCNC: 136 MMOL/L (ref 135–147)
SP GR UR STRIP.AUTO: 1.01 (ref 1–1.03)
UROBILINOGEN UR STRIP-ACNC: <2 MG/DL
WBC # BLD AUTO: 4.9 THOUSAND/UL (ref 4.31–10.16)
WBC #/AREA URNS AUTO: NORMAL /HPF

## 2024-08-28 PROCEDURE — 36415 COLL VENOUS BLD VENIPUNCTURE: CPT

## 2024-08-28 PROCEDURE — 84156 ASSAY OF PROTEIN URINE: CPT

## 2024-08-28 PROCEDURE — 84100 ASSAY OF PHOSPHORUS: CPT

## 2024-08-28 PROCEDURE — 81001 URINALYSIS AUTO W/SCOPE: CPT

## 2024-08-28 PROCEDURE — 82570 ASSAY OF URINE CREATININE: CPT

## 2024-08-28 PROCEDURE — 85025 COMPLETE CBC W/AUTO DIFF WBC: CPT

## 2024-08-28 PROCEDURE — 80053 COMPREHEN METABOLIC PANEL: CPT

## 2024-08-28 PROCEDURE — 83970 ASSAY OF PARATHORMONE: CPT

## 2024-08-28 PROCEDURE — 82306 VITAMIN D 25 HYDROXY: CPT

## 2024-09-03 ENCOUNTER — TELEPHONE (OUTPATIENT)
Age: 84
End: 2024-09-03

## 2024-09-03 NOTE — TELEPHONE ENCOUNTER
Patient Aurelio [phone] contacting office scheduled for his  overdue routine f/u with MARIKA Gillis on 1/11/2024.    Patient inquiring if blood work is needed prior to appointment?    If blood work is needed, please contact patient to advise.    Thank you.

## 2024-09-12 ENCOUNTER — OFFICE VISIT (OUTPATIENT)
Dept: NEPHROLOGY | Facility: CLINIC | Age: 84
End: 2024-09-12
Payer: COMMERCIAL

## 2024-09-12 VITALS
OXYGEN SATURATION: 96 % | RESPIRATION RATE: 16 BRPM | BODY MASS INDEX: 29.35 KG/M2 | HEIGHT: 67 IN | HEART RATE: 74 BPM | WEIGHT: 187 LBS | DIASTOLIC BLOOD PRESSURE: 60 MMHG | TEMPERATURE: 97.6 F | SYSTOLIC BLOOD PRESSURE: 160 MMHG

## 2024-09-12 DIAGNOSIS — N18.32 STAGE 3B CHRONIC KIDNEY DISEASE (HCC): ICD-10-CM

## 2024-09-12 DIAGNOSIS — N40.0 BENIGN PROSTATIC HYPERPLASIA WITHOUT LOWER URINARY TRACT SYMPTOMS: Primary | ICD-10-CM

## 2024-09-12 PROCEDURE — 99214 OFFICE O/P EST MOD 30 MIN: CPT | Performed by: INTERNAL MEDICINE

## 2024-09-12 PROCEDURE — G2211 COMPLEX E/M VISIT ADD ON: HCPCS | Performed by: INTERNAL MEDICINE

## 2024-09-12 RX ORDER — TAMSULOSIN HYDROCHLORIDE 0.4 MG/1
0.4 CAPSULE ORAL
Qty: 30 CAPSULE | Refills: 6 | Status: SHIPPED | OUTPATIENT
Start: 2024-09-12

## 2024-09-12 NOTE — PROGRESS NOTES
NEPHROLOGY PROGRESS NOTE    Patient: Aurelio Thomas               Sex: male          DOA: No admission date for patient encounter.   YOB: 1940        Age:  84 y.o.       9/12/2024        BACKGROUND     83 years old male with past medical history of hypertension, chronic kidney disease stage IIIb, excessive alcohol use who has been following up with the renal office since June 2023.    SUBJECTIVE   \    Patient presents to renal office after 7 months.  States that since initiating Flonase his sore throat has improved.  He is also sleeping slightly longer at nighttime.  States that hydralazine 10 mg p.o. 3 times daily was added to his regimen by his PCP however he is only taking the medication twice daily.  REVIEW OF SYSTEMS     Review of Systems   Constitutional: Negative.    HENT: Negative.     Eyes: Negative.    Respiratory: Negative.     Cardiovascular: Negative.    Gastrointestinal: Negative.    Endocrine: Negative.    Genitourinary: Negative.    Musculoskeletal: Negative.    Skin: Negative.    Allergic/Immunologic: Negative.    Neurological: Negative.    Hematological: Negative.    All other systems reviewed and are negative.      OBJECTIVE     Current Weight: Weight - Scale: 84.8 kg (187 lb)  Vitals:    09/12/24 1308   BP: 160/60   Pulse: 74   Resp: 16   Temp: 97.6 °F (36.4 °C)   SpO2: 96%     Body mass index is 29.29 kg/m².      CURRENT MEDICATIONS       Current Outpatient Medications:     amLODIPine (NORVASC) 5 mg tablet, Take 2 tablets (10 mg total) by mouth daily, Disp: 180 tablet, Rfl: 1    bimatoprost (LUMIGAN) 0.03 % ophthalmic drops, , Disp: , Rfl:     Cholecalciferol (Vitamin D3) 1.25 MG (46459 UT) CAPS, Take by mouth, Disp: , Rfl:     Chromium Picolinate (CHROMIUM PICOLATE PO), Take by mouth, Disp: , Rfl:     CINNAMON PO, Take by mouth, Disp: , Rfl:     Coenzyme Q10 (COQ10 PO), Take by mouth, Disp: , Rfl:     Emollient (DHEA EX), Apply topically, Disp: , Rfl:     fluticasone  (FLONASE) 50 mcg/act nasal spray, 2 sprays into each nostril daily, Disp: 11.1 mL, Rfl: 1    GARLIC 1500 PO, Take by mouth, Disp: , Rfl:     hydrALAZINE (APRESOLINE) 10 mg tablet, Take 1 tablet (10 mg total) by mouth 3 (three) times a day, Disp: 90 tablet, Rfl: 5    hydroCHLOROthiazide 25 mg tablet, Take 1 tablet (25 mg total) by mouth daily, Disp: 100 tablet, Rfl: 1    losartan (COZAAR) 100 MG tablet, Take 1 tablet (100 mg total) by mouth daily, Disp: 100 tablet, Rfl: 1    Omega-3 Fatty Acids (Fish Oil) 300 MG CAPS, Take by mouth, Disp: , Rfl:     tamsulosin (FLOMAX) 0.4 mg, Take 1 capsule (0.4 mg total) by mouth daily with dinner, Disp: 30 capsule, Rfl: 6    vitamin B-12 (VITAMIN B-12) 1,000 mcg tablet, Take by mouth daily, Disp: , Rfl:     Zinc Sulfate (ZINC-220 PO), Take by mouth, Disp: , Rfl:       PHYSICAL EXAMINATION     Physical Exam  HENT:      Head: Normocephalic and atraumatic.   Eyes:      Pupils: Pupils are equal, round, and reactive to light.   Neck:      Vascular: No JVD.   Cardiovascular:      Rate and Rhythm: Normal rate and regular rhythm.      Heart sounds: Normal heart sounds. No murmur heard.     No friction rub.   Pulmonary:      Effort: Pulmonary effort is normal.      Breath sounds: Normal breath sounds.   Abdominal:      General: Bowel sounds are normal. There is no distension.      Palpations: Abdomen is soft.      Tenderness: There is no abdominal tenderness. There is no rebound.   Musculoskeletal:         General: No tenderness or edema.      Cervical back: Neck supple.   Skin:     General: Skin is dry.      Findings: No rash.   Neurological:      Mental Status: He is alert and oriented to person, place, and time.   Psychiatric:         Mood and Affect: Mood and affect normal.           LAB RESULTS     Results from last 6 Months   Lab Units 08/28/24  1019 07/23/24  1146   WBC Thousand/uL 4.90  --    HEMOGLOBIN g/dL 12.7  --    HEMATOCRIT % 38.8  --    PLATELETS Thousands/uL 201  --     POTASSIUM mmol/L 4.6 4.4   CHLORIDE mmol/L 104 104   CO2 mmol/L 24 25   BUN mg/dL 33* 42*   CREATININE mg/dL 1.48* 1.66*   CALCIUM mg/dL 9.3 9.2   PHOSPHORUS mg/dL 3.3  --              RADIOLOGY RESULTS        IMPRESSION:     1.  No hydronephrosis or shadowing renal calculi.     2.  Prostatomegaly.       ASSESSMENT/PLAN     83 years old male with past medical history of hypertension, chronic kidney disease stage IIIb, type II heart block, excessive alcohol use who presents to renal office for management of CKD.    1.  Chronic kidney disease stage IIIb: Baseline creatinine has ranged anywhere between 1.3-1.5.  Current serum creatinine is 1.4 milligrams per deciliter and within range.  Etiology of CKD is age-related nephron loss, excessive alcohol use in his lifetime as well as hypertensive nephrosclerosis.  Urinalysis did not reveal any signs of proteinuria or hematuria.  Urinalysis does not reveal any proteinuria.    2.  Hypertension due to CKD: Blood pressure was elevated upon arrival however upon repeat measurement noted to be 140/70.  Remains on 4 anti hypertensive medications.  Recommended low-salt diet.    3.  Proteinuria: Not detected.  Remains on Cozaar as antiproteinuric agent.    4.  Bone mineral disorder: Normal calcium, phosphorus and 25-hydroxy vitamin D levels were noted.    5.  Nutrition: 64 ounces of water, low-salt diet moderate potassium diet recommended    #6 BPH: Ultrasound revealed prostatomegaly.  Given presence of nocturia, will attempt to administer patient Flomax 0.4 mg p.o. nightly.        Alvaro Saba MD  Nephrology  9/12/2024    used

## 2024-09-19 DIAGNOSIS — J34.2 DEVIATED SEPTUM: ICD-10-CM

## 2024-09-19 DIAGNOSIS — R09.81 CHRONIC NASAL CONGESTION: ICD-10-CM

## 2024-09-19 DIAGNOSIS — R09.82 PND (POST-NASAL DRIP): ICD-10-CM

## 2024-09-19 RX ORDER — FLUTICASONE PROPIONATE 50 MCG
2 SPRAY, SUSPENSION (ML) NASAL DAILY
Qty: 48 G | Refills: 1 | Status: SHIPPED | OUTPATIENT
Start: 2024-09-19

## 2024-09-19 NOTE — TELEPHONE ENCOUNTER
Reason for call:   [x] Refill   [] Prior Auth  [] Other:     Office: [facility]   [x] PCP/Provider -Nicholas Clemens    [] Specialty/Provider -     Medication: fluticasone     Dose/Frequency: 50 mcg/ act nasal spray/ 2 sprays into each nostril daily     Quantity: 90 day supply     Pharmacy: [facility] in [place]     Does the patient have enough for 3 days?   [] Yes   [x] No - Send as HP to POD

## 2024-11-11 ENCOUNTER — OFFICE VISIT (OUTPATIENT)
Dept: CARDIOLOGY CLINIC | Facility: CLINIC | Age: 84
End: 2024-11-11
Payer: COMMERCIAL

## 2024-11-11 VITALS
DIASTOLIC BLOOD PRESSURE: 80 MMHG | RESPIRATION RATE: 16 BRPM | BODY MASS INDEX: 29.51 KG/M2 | HEIGHT: 67 IN | SYSTOLIC BLOOD PRESSURE: 160 MMHG | WEIGHT: 188 LBS | HEART RATE: 73 BPM | OXYGEN SATURATION: 98 %

## 2024-11-11 DIAGNOSIS — I44.1 SECOND DEGREE AV BLOCK, MOBITZ TYPE I: Primary | ICD-10-CM

## 2024-11-11 DIAGNOSIS — I10 PRIMARY HYPERTENSION: ICD-10-CM

## 2024-11-11 PROCEDURE — 99214 OFFICE O/P EST MOD 30 MIN: CPT

## 2024-11-11 RX ORDER — HYDRALAZINE HYDROCHLORIDE 25 MG/1
25 TABLET, FILM COATED ORAL 3 TIMES DAILY
Qty: 270 TABLET | Refills: 1 | Status: SHIPPED | OUTPATIENT
Start: 2024-11-11

## 2024-11-11 NOTE — ASSESSMENT & PLAN NOTE
24-hour Holter monitor reviewed with average HR 50 bpm, first-degree and predominantly second-degree heart block - Mobitz type I, rare VE with a 4 beat run of NSVT, and 2.3% SVE.  Patient denies associated palpitations, lightheadedness/dizziness, or syncope.  Exercise stress test in 11/2023 negative for evidence of ischemia.  Advised to notify our office for any new/worsening symptoms.

## 2024-11-11 NOTE — ASSESSMENT & PLAN NOTE
BP has been persistently elevated.  Increase hydralazine to 25 mg tid.  Continue amlodipine, hydrochlorothiazide, and losartan.  Not on AV jessica blockers due to tendency for bradycardia and history of Mobitz type I.  Encourage ambulatory monitoring and low-sodium diet.  Advised to notify our office for abnormal BP readings.

## 2024-11-11 NOTE — PROGRESS NOTES
[facility] Cardiology   Office Visit    Aurelio Thomas 84 y.o. male MRN: [id number]    11/11/24      Assessment & Plan  Second degree AV block, Mobitz type I  24-hour Holter monitor reviewed with average HR 50 bpm, first-degree and predominantly second-degree heart block - Mobitz type I, rare VE with a 4 beat run of NSVT, and 2.3% SVE.  Patient denies associated palpitations, lightheadedness/dizziness, or syncope.  Exercise stress test in 11/2023 negative for evidence of ischemia.  Advised to notify our office for any new/worsening symptoms.  Primary hypertension  BP has been persistently elevated.  Increase hydralazine to 25 mg tid.  Continue amlodipine, hydrochlorothiazide, and losartan.  Not on AV jessica blockers due to tendency for bradycardia and history of Mobitz type I.  Encourage ambulatory monitoring and low-sodium diet.  Advised to notify our office for abnormal BP readings.        Interval history: Aurelio Thomas is a 84 y.o. year old male with history of hypertension, Mobitz type I second-degree AV block, and CKD who presents for office visit.  Patient reports he has been well overall from a cardiac standpoint since time of last visit with cardiology.  Endorses strict compliance to all medications.  Denies adverse effects to current medications.  He does not monitor BP on a routine basis, however notes BP tends to run predominantly elevated.  He plans to begin monitoring more frequently and bring BP cuff to next office visit to assess accuracy.  Endorses chronic LE edema which is reportedly at baseline.  Denies palpitations, lightheadedness/dizziness, syncope, or any additional complaints at this time.       Review of Systems:  Review of Systems   Constitutional:  Negative for chills and fever.   HENT:  Negative for ear pain and sore throat.    Eyes:  Negative for pain and visual disturbance.   Respiratory:  Negative for cough and shortness of breath.    Cardiovascular:  Negative for chest pain and  "palpitations. Leg swelling: chronic.  Gastrointestinal:  Negative for abdominal pain and vomiting.   Genitourinary:  Negative for dysuria and hematuria.   Musculoskeletal:  Negative for arthralgias and back pain.   Skin:  Negative for color change and rash.   Neurological:  Negative for seizures and syncope.   All other systems reviewed and are negative.      PHYSICAL EXAM:  Vitals:   Vitals:    11/11/24 1631   BP: 160/80   BP Location: Left arm   Patient Position: Sitting   Cuff Size: Standard   Pulse: 73   Resp: 16   SpO2: 98%   Weight: 85.3 kg (188 lb)   Height: 5' 7\" (1.702 m)        Physical Exam:  GEN: Alert and oriented x 3, in no acute distress.  Well appearing and well nourished.   HEENT: Sclera anicteric, conjunctivae pink, mucous membranes moist. Oropharynx clear.   NECK: Supple, no carotid bruits, no significant JVD. Trachea midline, no thyromegaly.   HEART: Regular rhythm, normal S1 and S2, no murmurs, clicks, gallops or rubs. PMI nondisplaced, no thrills.   LUNGS: Clear to auscultation bilaterally; no wheezes, rales, or rhonchi. No increased work of breathing or signs of respiratory distress.   ABDOMEN: Soft, nontender, nondistended, normoactive bowel sounds.   EXTREMITIES: Skin warm and well perfused, no clubbing or cyanosis, + BL edema.  NEURO: No focal findings. Normal speech. Mood and affect normal.   SKIN: Normal without suspicious lesions on exposed skin.      No Known Allergies      Current Outpatient Medications:     amLODIPine (NORVASC) 5 mg tablet, Take 2 tablets (10 mg total) by mouth daily, Disp: 180 tablet, Rfl: 1    bimatoprost (LUMIGAN) 0.03 % ophthalmic drops, , Disp: , Rfl:     Cholecalciferol (Vitamin D3) 1.25 MG (63984 UT) CAPS, Take by mouth, Disp: , Rfl:     Chromium Picolinate (CHROMIUM PICOLATE PO), Take by mouth, Disp: , Rfl:     CINNAMON PO, Take by mouth, Disp: , Rfl:     Coenzyme Q10 (COQ10 PO), Take by mouth, Disp: , Rfl:     Emollient (DHEA EX), Apply topically, Disp: , " Rfl:     fluticasone (FLONASE) 50 mcg/act nasal spray, 2 sprays into each nostril daily, Disp: 48 g, Rfl: 1    GARLIC 1500 PO, Take by mouth, Disp: , Rfl:     hydrALAZINE (APRESOLINE) 25 mg tablet, Take 1 tablet (25 mg total) by mouth 3 (three) times a day, Disp: 270 tablet, Rfl: 1    hydroCHLOROthiazide 25 mg tablet, Take 1 tablet (25 mg total) by mouth daily, Disp: 100 tablet, Rfl: 1    losartan (COZAAR) 100 MG tablet, Take 1 tablet (100 mg total) by mouth daily, Disp: 100 tablet, Rfl: 1    Omega-3 Fatty Acids (Fish Oil) 300 MG CAPS, Take by mouth, Disp: , Rfl:     tamsulosin (FLOMAX) 0.4 mg, Take 1 capsule (0.4 mg total) by mouth daily with dinner, Disp: 30 capsule, Rfl: 6    vitamin B-12 (VITAMIN B-12) 1,000 mcg tablet, Take by mouth daily, Disp: , Rfl:     Zinc Sulfate (ZINC-220 PO), Take by mouth, Disp: , Rfl:     Past Medical History:   Diagnosis Date    Chronic kidney disease     Kidney stone        Family History   Problem Relation Age of Onset    Hypertension Mother     Hypertension Father     Parkinsonism Father        Past Medical History:   Diagnosis Date    Chronic kidney disease     Kidney stone        History reviewed. No pertinent surgical history.    Social History     Socioeconomic History    Marital status:      Spouse name: Not on file    Number of children: Not on file    Years of education: Not on file    Highest education level: Not on file   Occupational History    Not on file   Tobacco Use    Smoking status: Never     Passive exposure: Never    Smokeless tobacco: Never   Substance and Sexual Activity    Alcohol use: Yes     Comment: socially    Drug use: Never    Sexual activity: Not Currently     Partners: Female   Other Topics Concern    Not on file   Social History Narrative    Not on file     Social Determinants of Health     Financial Resource Strain: Low Risk  (5/16/2023)    Overall Financial Resource Strain (CARDIA)     Difficulty of Paying Living Expenses: Not hard at all  "  Food Insecurity: No Food Insecurity (7/25/2024)    Nursing - Inadequate Food Risk Classification     Worried About Running Out of Food in the Last Year: Never true     Ran Out of Food in the Last Year: Never true     Ran Out of Food in the Last Year: Not on file   Transportation Needs: No Transportation Needs (7/25/2024)    PRAPARE - Transportation     Lack of Transportation (Medical): No     Lack of Transportation (Non-Medical): No   Physical Activity: Not on file   Stress: Not on file   Social Connections: Not on file   Intimate Partner Violence: Not on file   Housing Stability: Unknown (7/25/2024)    Housing Stability Vital Sign     Unable to Pay for Housing in the Last Year: No     Number of Times Moved in the Last Year: Not on file     Homeless in the Last Year: Not on file         LABORATORY RESULTS:    Lab Results   Component Value Date    WBC 4.90 08/28/2024    HGB 12.7 08/28/2024    HCT 38.8 08/28/2024    MCV 93 08/28/2024     08/28/2024     Lab Results   Component Value Date    CALCIUM 9.3 08/28/2024    K 4.6 08/28/2024    CO2 24 08/28/2024     08/28/2024    BUN 33 (H) 08/28/2024    CREATININE 1.48 (H) 08/28/2024     Lab Results   Component Value Date    HGBA1C 5.4 06/30/2018       Lipid Profile:   No results found for: \"CHOL\"  Lab Results   Component Value Date    HDL 47 07/23/2024    HDL 70 05/23/2023     Lab Results   Component Value Date    LDLCALC 128 (H) 07/23/2024    LDLCALC 133 (H) 05/23/2023     Lab Results   Component Value Date    TRIG 79 07/23/2024    TRIG 96 05/23/2023    TRIG 59 01/14/2019       The ASCVD Risk score (Lelo DK, et al., 2019) failed to calculate for the following reasons:    The 2019 ASCVD risk score is only valid for ages 40 to 79    1. Second degree AV block, Mobitz type I  Lipid Panel with Direct LDL reflex      2. Primary hypertension  Lipid Panel with Direct LDL reflex    hydrALAZINE (APRESOLINE) 25 mg tablet          Imaging: I have reviewed all pertinent " imaging studies.      Recommend aggressive risk factor modification and therapeutic lifestyle changes.  Low-salt, low-calorie, low-fat, low-cholesterol diet with regular exercise and to optimize weight.    Discussed concepts of atherosclerosis, including signs and symptoms of cardiac disease.    Medications reviewed and possible side effects discussed.  Previous studies were reviewed.    Safety measures were reviewed.  All questions and concerns addressed.  Patient was advised to report any problems requiring medical attention.    Follow-up with PCP and appropriate specialist and lab work as discussed.    Return for follow up visit as scheduled or earlier, if needed.  Thank you for allowing me to participate in the care and evaluation of your patient.  Should you have any questions, please feel free to contact me.    Trung Gillis PA-C  11/11/2024,5:23 PM

## 2025-01-08 ENCOUNTER — RA CDI HCC (OUTPATIENT)
Dept: OTHER | Facility: HOSPITAL | Age: 85
End: 2025-01-08

## 2025-01-15 ENCOUNTER — OFFICE VISIT (OUTPATIENT)
Dept: FAMILY MEDICINE CLINIC | Facility: CLINIC | Age: 85
End: 2025-01-15
Payer: COMMERCIAL

## 2025-01-15 VITALS
DIASTOLIC BLOOD PRESSURE: 60 MMHG | HEART RATE: 74 BPM | HEIGHT: 67 IN | BODY MASS INDEX: 29.03 KG/M2 | WEIGHT: 185 LBS | TEMPERATURE: 97.2 F | SYSTOLIC BLOOD PRESSURE: 160 MMHG | OXYGEN SATURATION: 99 %

## 2025-01-15 DIAGNOSIS — N18.32 STAGE 3B CHRONIC KIDNEY DISEASE (HCC): Primary | ICD-10-CM

## 2025-01-15 DIAGNOSIS — J40 BRONCHITIS: ICD-10-CM

## 2025-01-15 DIAGNOSIS — I12.9 HYPERTENSIVE KIDNEY DISEASE: ICD-10-CM

## 2025-01-15 DIAGNOSIS — I10 PRIMARY HYPERTENSION: ICD-10-CM

## 2025-01-15 DIAGNOSIS — I44.1 SECOND DEGREE AV BLOCK, MOBITZ TYPE I: ICD-10-CM

## 2025-01-15 PROBLEM — N18.31 STAGE 3A CHRONIC KIDNEY DISEASE (HCC): Status: RESOLVED | Noted: 2023-07-14 | Resolved: 2025-01-15

## 2025-01-15 PROCEDURE — 99214 OFFICE O/P EST MOD 30 MIN: CPT | Performed by: PHYSICIAN ASSISTANT

## 2025-01-15 NOTE — ASSESSMENT & PLAN NOTE
Lab Results   Component Value Date    EGFR 42 08/28/2024    EGFR 37 07/23/2024    EGFR 42 12/22/2023    CREATININE 1.48 (H) 08/28/2024    CREATININE 1.66 (H) 07/23/2024    CREATININE 1.50 (H) 12/22/2023   Check renal function  In setting of resistant HTN despite 4 agents  Follow up cardiology/nephrology for further adjustment of antihypertensives    Orders:  •  Comprehensive metabolic panel; Future  •  CBC and differential; Future

## 2025-01-15 NOTE — PROGRESS NOTES
"Name: Aurelio Thomas      : 1940      MRN: [id number]  Encounter Provider: Nicholas Clemens PA-C  Encounter Date: 1/15/2025   Encounter department: [facility]    Assessment & Plan  Stage 3b chronic kidney disease (HCC)  Lab Results   Component Value Date    EGFR 42 2024    EGFR 37 2024    EGFR 42 2023    CREATININE 1.48 (H) 2024    CREATININE 1.66 (H) 2024    CREATININE 1.50 (H) 2023   Check renal function  In setting of resistant HTN despite 4 agents  Follow up cardiology/nephrology for further adjustment of antihypertensives    Orders:  •  Comprehensive metabolic panel; Future  •  CBC and differential; Future    Primary hypertension  As above  Orders:  •  Comprehensive metabolic panel; Future  •  CBC and differential; Future    Second degree AV block, Mobitz type I  No syncope or cardiac complaints. Normal rate.        Hypertensive kidney disease         Bronchitis  Seek CXR, clear by exam with symptomatic improvement.  Orders:  •  XR chest pa and lateral; Future         History of Present Illness     Pt presents for follow up     HTN: resistant, on max dose losartan, amlodipine and hctz. Also with hydralazine 25mg TID. In setting of CKD. Not at goal. No end organ complaints  AV block, 2nd degree mobitz I: follows with cardiology. No pacer, no syncope or lightheadedness.   No recent labs  He is getting over a \"chest cold\" for two weeks, mostly with cough and chest congestion. Denies fever or SOB. Notes sx have improved         Review of Systems   Constitutional:  Negative for chills, fatigue and fever.   HENT:  Negative for congestion, ear pain, hearing loss, nosebleeds, postnasal drip, rhinorrhea, sinus pressure, sinus pain, sneezing and sore throat.    Eyes:  Negative for pain, discharge, itching and visual disturbance.   Respiratory:  Positive for cough. Negative for chest tightness, shortness of breath and wheezing.    Cardiovascular:  " Negative for chest pain, palpitations and leg swelling.   Gastrointestinal:  Negative for abdominal pain, blood in stool, constipation, diarrhea, nausea and vomiting.   Genitourinary:  Negative for frequency and urgency.   Neurological:  Negative for dizziness, light-headedness and numbness.     Past Medical History:   Diagnosis Date   • Chronic kidney disease    • Kidney stone      History reviewed. No pertinent surgical history.  Family History   Problem Relation Age of Onset   • Hypertension Mother    • Hypertension Father    • Parkinsonism Father      Social History     Tobacco Use   • Smoking status: Never     Passive exposure: Never   • Smokeless tobacco: Never   Vaping Use   • Vaping status: Never Used   Substance and Sexual Activity   • Alcohol use: Yes     Comment: socially   • Drug use: Never   • Sexual activity: Not Currently     Partners: Female     Current Outpatient Medications on File Prior to Visit   Medication Sig   • amLODIPine (NORVASC) 5 mg tablet Take 2 tablets (10 mg total) by mouth daily   • bimatoprost (LUMIGAN) 0.03 % ophthalmic drops    • Cholecalciferol (Vitamin D3) 1.25 MG (99038 UT) CAPS Take by mouth   • Chromium Picolinate (CHROMIUM PICOLATE PO) Take by mouth   • CINNAMON PO Take by mouth   • Coenzyme Q10 (COQ10 PO) Take by mouth   • Emollient (DHEA EX) Apply topically   • fluticasone (FLONASE) 50 mcg/act nasal spray 2 sprays into each nostril daily   • GARLIC 1500 PO Take by mouth   • hydrALAZINE (APRESOLINE) 25 mg tablet Take 1 tablet (25 mg total) by mouth 3 (three) times a day   • hydroCHLOROthiazide 25 mg tablet Take 1 tablet (25 mg total) by mouth daily   • losartan (COZAAR) 100 MG tablet Take 1 tablet (100 mg total) by mouth daily   • Omega-3 Fatty Acids (Fish Oil) 300 MG CAPS Take by mouth   • vitamin B-12 (VITAMIN B-12) 1,000 mcg tablet Take by mouth daily   • Zinc Sulfate (ZINC-220 PO) Take by mouth   • [DISCONTINUED] tamsulosin (FLOMAX) 0.4 mg Take 1 capsule (0.4 mg total)  "by mouth daily with dinner     No Known Allergies    There is no immunization history on file for this patient.  Objective   /60 (BP Location: Left arm, Patient Position: Sitting, Cuff Size: Large)   Pulse 74   Temp (!) 97.2 °F (36.2 °C)   Ht 5' 7\" (1.702 m)   Wt 83.9 kg (185 lb)   SpO2 99%   BMI 28.98 kg/m²     Physical Exam  Vitals and nursing note reviewed.   Constitutional:       General: He is not in acute distress.     Appearance: He is well-developed.   HENT:      Head: Normocephalic and atraumatic.      Nose: Nose normal.      Mouth/Throat:      Mouth: Mucous membranes are moist.      Pharynx: Oropharynx is clear.   Cardiovascular:      Rate and Rhythm: Normal rate and regular rhythm.      Heart sounds: No murmur heard.  Pulmonary:      [place]: Pulmonary effort is normal. No respiratory distress.      Breath sounds: Normal breath sounds. No wheezing, rhonchi or rales.   Musculoskeletal:         General: No swelling.      Cervical back: Neck supple.   Skin:     General: Skin is warm and dry.      Capillary Refill: Capillary refill takes less than 2 seconds.   Neurological:      Mental Status: He is alert.         "

## 2025-01-16 ENCOUNTER — TELEPHONE (OUTPATIENT)
Dept: FAMILY MEDICINE CLINIC | Facility: CLINIC | Age: 85
End: 2025-01-16

## 2025-01-16 NOTE — TELEPHONE ENCOUNTER
Lou's follow up appointment was scheduled for 7/15 and his wellness is due about 10 days later. Call to see if patient wants to combine the appointments

## 2025-01-24 DIAGNOSIS — I10 PRIMARY HYPERTENSION: ICD-10-CM

## 2025-01-24 DIAGNOSIS — N18.31 STAGE 3A CHRONIC KIDNEY DISEASE (HCC): ICD-10-CM

## 2025-01-24 RX ORDER — HYDROCHLOROTHIAZIDE 25 MG/1
25 TABLET ORAL DAILY
Qty: 100 TABLET | Refills: 1 | Status: SHIPPED | OUTPATIENT
Start: 2025-01-24

## 2025-01-24 RX ORDER — AMLODIPINE BESYLATE 5 MG/1
10 TABLET ORAL DAILY
Qty: 180 TABLET | Refills: 1 | Status: SHIPPED | OUTPATIENT
Start: 2025-01-24

## 2025-01-24 RX ORDER — LOSARTAN POTASSIUM 100 MG/1
100 TABLET ORAL DAILY
Qty: 100 TABLET | Refills: 1 | Status: SHIPPED | OUTPATIENT
Start: 2025-01-24 | End: 2025-07-23

## 2025-01-31 ENCOUNTER — TELEPHONE (OUTPATIENT)
Dept: NEPHROLOGY | Facility: CLINIC | Age: 85
End: 2025-01-31

## 2025-01-31 NOTE — TELEPHONE ENCOUNTER
I called and spoke to the patient and reschedule his appointment nephrology follow up appointment with Dr. PAMELA Saba from 8/20/2025 to 9/11/2025 because of Dr. PAMELA aSba not in the office. The patient understood and was okay with the day and time of his next appointment.

## 2025-02-18 DIAGNOSIS — J34.2 DEVIATED SEPTUM: ICD-10-CM

## 2025-02-18 DIAGNOSIS — R09.81 CHRONIC NASAL CONGESTION: ICD-10-CM

## 2025-02-18 DIAGNOSIS — R09.82 PND (POST-NASAL DRIP): ICD-10-CM

## 2025-02-18 RX ORDER — FLUTICASONE PROPIONATE 50 MCG
2 SPRAY, SUSPENSION (ML) NASAL DAILY
Qty: 48 G | Refills: 1 | Status: SHIPPED | OUTPATIENT
Start: 2025-02-18 | End: 2025-05-19

## 2025-03-06 DIAGNOSIS — I10 PRIMARY HYPERTENSION: ICD-10-CM

## 2025-03-06 RX ORDER — HYDROCHLOROTHIAZIDE 25 MG/1
25 TABLET ORAL DAILY
Qty: 100 TABLET | Refills: 0 | Status: SHIPPED | OUTPATIENT
Start: 2025-03-06

## 2025-03-06 NOTE — TELEPHONE ENCOUNTER
Reason for call: NOT A DUPLICATE pharmacy switch    [x] Refill   [] Prior Auth  [] Other:     Office:   [x] PCP/Provider -  Nicholas Clemens PA-C   [] Specialty/Provider -     Medication: hydroCHLOROthiazide 25 mg tablet     Dose/Frequency: Take 1 tablet (25 mg total) by mouth daily     Quantity: 100    Pharmacy: [facility]/pharmacy #[id number] - [place], [place] - [address] [phone]    Local Pharmacy   Does the patient have enough for 3 days?   [] Yes   [x] No - Send as HP to POD

## 2025-03-10 DIAGNOSIS — I10 PRIMARY HYPERTENSION: ICD-10-CM

## 2025-03-10 RX ORDER — HYDRALAZINE HYDROCHLORIDE 25 MG/1
25 TABLET, FILM COATED ORAL 3 TIMES DAILY
Qty: 270 TABLET | Refills: 1 | Status: SHIPPED | OUTPATIENT
Start: 2025-03-10

## 2025-03-10 NOTE — TELEPHONE ENCOUNTER
Change pharmacy  Reason for call:   [x] Refill   [] Prior Auth  [] Other:     Office:   [] PCP/Provider -   [x] Specialty/Provider - [facility] [place]  Authorized By: Trung Gillis PA-C      Medication: hydrALAZINE (APRESOLINE) 25 mg tablet    Dose/Frequency: Take 1 tablet (25 mg total) by mouth 3 (three) times a day    Quantity: 270 tablet    Pharmacy: [facility]/pharmacy #[id number] - [place], [place] - [address]     Local Pharmacy   Does the patient have enough for 3 days?   [] Yes   [x] No - Send as HP to POD    Mail Away Pharmacy   Does the patient have enough for 10 days?   [] Yes   [] No - Send as HP to POD

## 2025-05-22 ENCOUNTER — APPOINTMENT (OUTPATIENT)
Dept: LAB | Facility: CLINIC | Age: 85
End: 2025-05-22
Payer: COMMERCIAL

## 2025-05-22 DIAGNOSIS — I44.1 SECOND DEGREE AV BLOCK, MOBITZ TYPE I: ICD-10-CM

## 2025-05-22 DIAGNOSIS — I10 PRIMARY HYPERTENSION: ICD-10-CM

## 2025-05-22 DIAGNOSIS — N18.32 STAGE 3B CHRONIC KIDNEY DISEASE (HCC): ICD-10-CM

## 2025-05-22 LAB
ALBUMIN SERPL BCG-MCNC: 4.1 G/DL (ref 3.5–5)
ALP SERPL-CCNC: 58 U/L (ref 34–104)
ALT SERPL W P-5'-P-CCNC: 18 U/L (ref 7–52)
ANION GAP SERPL CALCULATED.3IONS-SCNC: 6 MMOL/L (ref 4–13)
AST SERPL W P-5'-P-CCNC: 16 U/L (ref 13–39)
BASOPHILS # BLD AUTO: 0.05 THOUSANDS/ÂΜL (ref 0–0.1)
BASOPHILS NFR BLD AUTO: 1 % (ref 0–1)
BILIRUB SERPL-MCNC: 0.46 MG/DL (ref 0.2–1)
BUN SERPL-MCNC: 39 MG/DL (ref 5–25)
CALCIUM SERPL-MCNC: 8.8 MG/DL (ref 8.4–10.2)
CHLORIDE SERPL-SCNC: 105 MMOL/L (ref 96–108)
CHOLEST SERPL-MCNC: 154 MG/DL (ref ?–200)
CO2 SERPL-SCNC: 25 MMOL/L (ref 21–32)
CREAT SERPL-MCNC: 1.86 MG/DL (ref 0.6–1.3)
EOSINOPHIL # BLD AUTO: 0.3 THOUSAND/ÂΜL (ref 0–0.61)
EOSINOPHIL NFR BLD AUTO: 6 % (ref 0–6)
ERYTHROCYTE [DISTWIDTH] IN BLOOD BY AUTOMATED COUNT: 13.3 % (ref 11.6–15.1)
GFR SERPL CREATININE-BSD FRML MDRD: 32 ML/MIN/1.73SQ M
GLUCOSE P FAST SERPL-MCNC: 94 MG/DL (ref 65–99)
HCT VFR BLD AUTO: 35.8 % (ref 36.5–49.3)
HDLC SERPL-MCNC: 44 MG/DL
HGB BLD-MCNC: 11.6 G/DL (ref 12–17)
IMM GRANULOCYTES # BLD AUTO: 0.01 THOUSAND/UL (ref 0–0.2)
IMM GRANULOCYTES NFR BLD AUTO: 0 % (ref 0–2)
LDLC SERPL CALC-MCNC: 98 MG/DL (ref 0–100)
LYMPHOCYTES # BLD AUTO: 1.48 THOUSANDS/ÂΜL (ref 0.6–4.47)
LYMPHOCYTES NFR BLD AUTO: 29 % (ref 14–44)
MCH RBC QN AUTO: 29.7 PG (ref 26.8–34.3)
MCHC RBC AUTO-ENTMCNC: 32.4 G/DL (ref 31.4–37.4)
MCV RBC AUTO: 92 FL (ref 82–98)
MONOCYTES # BLD AUTO: 0.53 THOUSAND/ÂΜL (ref 0.17–1.22)
MONOCYTES NFR BLD AUTO: 10 % (ref 4–12)
NEUTROPHILS # BLD AUTO: 2.76 THOUSANDS/ÂΜL (ref 1.85–7.62)
NEUTS SEG NFR BLD AUTO: 54 % (ref 43–75)
NRBC BLD AUTO-RTO: 0 /100 WBCS
PLATELET # BLD AUTO: 174 THOUSANDS/UL (ref 149–390)
PMV BLD AUTO: 10.2 FL (ref 8.9–12.7)
POTASSIUM SERPL-SCNC: 4.4 MMOL/L (ref 3.5–5.3)
PROT SERPL-MCNC: 6.9 G/DL (ref 6.4–8.4)
RBC # BLD AUTO: 3.9 MILLION/UL (ref 3.88–5.62)
SODIUM SERPL-SCNC: 136 MMOL/L (ref 135–147)
TRIGL SERPL-MCNC: 58 MG/DL (ref ?–150)
WBC # BLD AUTO: 5.13 THOUSAND/UL (ref 4.31–10.16)

## 2025-05-22 PROCEDURE — 80061 LIPID PANEL: CPT

## 2025-05-22 PROCEDURE — 36415 COLL VENOUS BLD VENIPUNCTURE: CPT

## 2025-05-22 PROCEDURE — 85025 COMPLETE CBC W/AUTO DIFF WBC: CPT

## 2025-05-22 PROCEDURE — 80053 COMPREHEN METABOLIC PANEL: CPT

## 2025-05-23 ENCOUNTER — RESULTS FOLLOW-UP (OUTPATIENT)
Dept: FAMILY MEDICINE CLINIC | Facility: CLINIC | Age: 85
End: 2025-05-23

## 2025-05-28 ENCOUNTER — OFFICE VISIT (OUTPATIENT)
Dept: CARDIOLOGY CLINIC | Facility: CLINIC | Age: 85
End: 2025-05-28
Payer: COMMERCIAL

## 2025-05-28 VITALS
OXYGEN SATURATION: 98 % | SYSTOLIC BLOOD PRESSURE: 150 MMHG | RESPIRATION RATE: 16 BRPM | WEIGHT: 184 LBS | BODY MASS INDEX: 28.88 KG/M2 | HEIGHT: 67 IN | DIASTOLIC BLOOD PRESSURE: 58 MMHG | HEART RATE: 69 BPM

## 2025-05-28 DIAGNOSIS — I10 PRIMARY HYPERTENSION: Primary | ICD-10-CM

## 2025-05-28 DIAGNOSIS — I44.1 SECOND DEGREE AV BLOCK, MOBITZ TYPE I: ICD-10-CM

## 2025-05-28 PROCEDURE — 99214 OFFICE O/P EST MOD 30 MIN: CPT | Performed by: INTERNAL MEDICINE

## 2025-05-28 NOTE — PROGRESS NOTES
[facility] Cardiology   Office Visit    Aurelio Thomas 84 y.o. male MRN: [id number]    25      Assessment & Plan    Second degree AV block, Mobitz type I  24-hour Holter monitor reviewed with average HR 50 bpm, first-degree and predominantly second-degree heart block - Mobitz type I, rare VE with a 4 beat run of NSVT, and 2.3% SVE.  Patient denies associated palpitations, lightheadedness/dizziness, or syncope.  Exercise stress test in 2023 negative for evidence of ischemia.  Advised to notify our office for any new/worsening symptoms.  Primary hypertension  BP is still a bit elevated. He states bp today is much lower than he usually runs.  Continue  hydralazine to 25 mg tid.  Continue amlodipine, hydrochlorothiazide, and losartan.  Not on AV jessica blockers due to tendency for bradycardia and history of Mobitz type I.  He follows with nephrology. He declines further medical therapy and plans on walking more.  Encourage ambulatory monitoring and low-sodium diet.  Advised to notify our office for abnormal BP readings.         Interval history: Aurelio Thomas is a 84 y.o. year old male with history of hypertension, Mobitz type I second-degree AV block, and CKD who presents for fu office visit.  Patient reports he has been well overall from a cardiac standpoint since time of last visit with cardiology. He has no cp, sob, cv symptoms.  Endorses strict compliance to all medications.  Denies adverse effects to current medications.  He does not monitor BP on a routine basis, however notes BP tends to run predominantly elevated.  He plans to begin monitoring more frequently and bring BP cuff to next office visit to assess accuracy.  Endorses chronic LE edema which is reportedly at baseline.  Denies palpitations, lightheadedness/dizziness, syncope, or any additional complaints at this time.   Wife  2 years ago.     Echo 2023: EF 60-65%. CATRACHO. Mild to mod MR. Mild- mod TR.      Holter 4/3/2024: There  was evidence of first degree and predominantly second-degree heart block Mobitz 1 (wenckebach). 2:1 AVB was also noted. The longest R-R interval was 2.9 seconds at 5:48am. The average heart rate was 50 beats per minute. The heart rate ranged from 30 to 102 beats per minute.   Ventricular ectopic activity consisted of 14 beats (<0.1% of total beats). There was one 4-beat run of nonsustained ventricular tachycardia/idioventricular rhythm .    Exercise EKG 11/29/23:   1.  Fair exercise tolerance-6.1 METS  2.  No chest discomfort with exercise  3.  Resting EKG showed Mobitz 1 heart block second-degree  4.  With exercise heart rate increased appropriately and conduction also improved to first-degree block  5.  Negative EKG stress test       Review of Systems:  Review of Systems   Constitutional:  Negative for chills and fever.   HENT:  Negative for ear pain and sore throat.    Eyes:  Negative for pain and visual disturbance.   Respiratory:  Negative for cough and shortness of breath.    Cardiovascular:  Negative for chest pain and palpitations. Leg swelling: chronic.  Gastrointestinal:  Negative for abdominal pain and vomiting.   Genitourinary:  Negative for dysuria and hematuria.   Musculoskeletal:  Negative for arthralgias and back pain.   Skin:  Negative for color change and rash.   Neurological:  Negative for seizures and syncope.   All other systems reviewed and are negative.      PHYSICAL EXAM:  Vitals:   150/58   P 69    Physical Exam:  GEN: Alert and oriented x 3, in no acute distress.  Well appearing and well nourished.   HEENT: Sclera anicteric, conjunctivae pink, mucous membranes moist. Oropharynx clear.   NECK: Supple, no carotid bruits, no significant JVD. Trachea midline, no thyromegaly.   HEART: Regular rhythm, normal S1 and S2, no murmurs, clicks, gallops or rubs. PMI nondisplaced, no thrills.   LUNGS: Clear to auscultation bilaterally; no wheezes, rales, or rhonchi. No increased work of breathing or  signs of respiratory distress.   ABDOMEN: Soft, nontender, nondistended, normoactive bowel sounds.   EXTREMITIES: Skin warm and well perfused, no clubbing or cyanosis, + BL edema.  NEURO: No focal findings. Normal speech. Mood and affect normal.   SKIN: Normal without suspicious lesions on exposed skin.      No Known Allergies      Current Outpatient Medications:     amLODIPine (NORVASC) 5 mg tablet, Take 2 tablets (10 mg total) by mouth daily, Disp: 180 tablet, Rfl: 1    bimatoprost (LUMIGAN) 0.03 % ophthalmic drops, , Disp: , Rfl:     Cholecalciferol (Vitamin D3) 1.25 MG (85626 UT) CAPS, Take by mouth, Disp: , Rfl:     Chromium Picolinate (CHROMIUM PICOLATE PO), Take by mouth, Disp: , Rfl:     CINNAMON PO, Take by mouth, Disp: , Rfl:     Coenzyme Q10 (COQ10 PO), Take by mouth, Disp: , Rfl:     Emollient (DHEA EX), Apply topically, Disp: , Rfl:     fluticasone (FLONASE) 50 mcg/act nasal spray, 2 sprays into each nostril daily, Disp: 48 g, Rfl: 1    GARLIC 1500 PO, Take by mouth, Disp: , Rfl:     hydrALAZINE (APRESOLINE) 25 mg tablet, Take 1 tablet (25 mg total) by mouth 3 (three) times a day, Disp: 270 tablet, Rfl: 1    hydroCHLOROthiazide 25 mg tablet, Take 1 tablet (25 mg total) by mouth daily, Disp: 100 tablet, Rfl: 0    losartan (COZAAR) 100 MG tablet, Take 1 tablet (100 mg total) by mouth daily, Disp: 100 tablet, Rfl: 1    Omega-3 Fatty Acids (Fish Oil) 300 MG CAPS, Take by mouth, Disp: , Rfl:     vitamin B-12 (VITAMIN B-12) 1,000 mcg tablet, Take by mouth in the morning., Disp: , Rfl:     Zinc Sulfate (ZINC-220 PO), Take by mouth, Disp: , Rfl:     Past Medical History:   Diagnosis Date    Chronic kidney disease     Kidney stone        Family History   Problem Relation Name Age of Onset    Hypertension Mother      Hypertension Father      Parkinsonism Father         Past Medical History:   Diagnosis Date    Chronic kidney disease     Kidney stone        No past surgical history on file.    Social History      Socioeconomic History    Marital status:      Spouse name: Not on file    Number of children: Not on file    Years of education: Not on file    Highest education level: Not on file   Occupational History    Not on file   Tobacco Use    Smoking status: Never     Passive exposure: Never    Smokeless tobacco: Never   Vaping Use    Vaping status: Never Used   Substance and Sexual Activity    Alcohol use: Yes     Comment: socially    Drug use: Never    Sexual activity: Not Currently     Partners: Female   Other Topics Concern    Not on file   Social History Narrative    Not on file     Social Drivers of Health     Financial Resource Strain: Low Risk  (5/16/2023)    Overall Financial Resource Strain (CARDIA)     Difficulty of Paying Living Expenses: Not hard at all   Food Insecurity: No Food Insecurity (7/25/2024)    Nursing - Inadequate Food Risk Classification     Worried About Running Out of Food in the Last Year: Never true     Ran Out of Food in the Last Year: Never true     Ran Out of Food in the Last Year: Not on file   Transportation Needs: No Transportation Needs (7/25/2024)    PRAPARE - Transportation     Lack of Transportation (Medical): No     Lack of Transportation (Non-Medical): No   Physical Activity: Not on file   Stress: Not on file   Social Connections: Not on file   Intimate Partner Violence: Not on file   Housing Stability: Unknown (7/25/2024)    Housing Stability Vital Sign     Unable to Pay for Housing in the Last Year: No     Number of Times Moved in the Last Year: Not on file     Homeless in the Last Year: Not on file         LABORATORY RESULTS:    Lab Results   Component Value Date    WBC 5.13 05/22/2025    HGB 11.6 (L) 05/22/2025    HCT 35.8 (L) 05/22/2025    MCV 92 05/22/2025     05/22/2025     Lab Results   Component Value Date    CALCIUM 8.8 05/22/2025    K 4.4 05/22/2025    CO2 25 05/22/2025     05/22/2025    BUN 39 (H) 05/22/2025    CREATININE 1.86 (H) 05/22/2025  "    Lab Results   Component Value Date    HGBA1C 5.4 06/30/2018       Lipid Profile:   No results found for: \"CHOL\"  Lab Results   Component Value Date    HDL 44 05/22/2025    HDL 47 07/23/2024    HDL 70 05/23/2023     Lab Results   Component Value Date    LDLCALC 98 05/22/2025    LDLCALC 128 (H) 07/23/2024    LDLCALC 133 (H) 05/23/2023     Lab Results   Component Value Date    TRIG 58 05/22/2025    TRIG 79 07/23/2024    TRIG 96 05/23/2023       The ASCVD Risk score (Lelo PRABHAKAR, et al., 2019) failed to calculate for the following reasons:    The 2019 ASCVD risk score is only valid for ages 40 to 79    1. Primary hypertension        2. Second degree AV block, Mobitz type I        3. Cardiac murmur            Imaging: I have reviewed all pertinent imaging studies.      Recommend aggressive risk factor modification and therapeutic lifestyle changes.  Low-salt, low-calorie, low-fat, low-cholesterol diet with regular exercise and to optimize weight.    Discussed concepts of atherosclerosis, including signs and symptoms of cardiac disease.    Medications reviewed and possible side effects discussed.  Previous studies were reviewed.    Safety measures were reviewed.  All questions and concerns addressed.  Patient was advised to report any problems requiring medical attention.    Follow-up with PCP and appropriate specialist and lab work as discussed.    Return for follow up visit as scheduled or earlier, if needed.  Thank you for allowing me to participate in the care and evaluation of your patient.  Should you have any questions, please feel free to contact me.    Cecilio Logan MD  5/28/2025,2:36 PM   "

## 2025-07-05 DIAGNOSIS — I10 PRIMARY HYPERTENSION: ICD-10-CM

## 2025-07-07 RX ORDER — HYDROCHLOROTHIAZIDE 25 MG/1
25 TABLET ORAL DAILY
Qty: 90 TABLET | Refills: 1 | Status: SHIPPED | OUTPATIENT
Start: 2025-07-07

## 2025-07-17 DIAGNOSIS — N18.31 STAGE 3A CHRONIC KIDNEY DISEASE (HCC): ICD-10-CM

## 2025-07-17 DIAGNOSIS — I10 PRIMARY HYPERTENSION: ICD-10-CM

## 2025-07-18 DIAGNOSIS — I10 PRIMARY HYPERTENSION: ICD-10-CM

## 2025-07-18 RX ORDER — AMLODIPINE BESYLATE 5 MG/1
10 TABLET ORAL DAILY
Qty: 180 TABLET | Refills: 1 | Status: SHIPPED | OUTPATIENT
Start: 2025-07-18

## 2025-07-18 RX ORDER — LOSARTAN POTASSIUM 100 MG/1
100 TABLET ORAL DAILY
Qty: 90 TABLET | Refills: 1 | Status: SHIPPED | OUTPATIENT
Start: 2025-07-18

## 2025-07-29 ENCOUNTER — OFFICE VISIT (OUTPATIENT)
Dept: FAMILY MEDICINE CLINIC | Facility: CLINIC | Age: 85
End: 2025-07-29
Payer: COMMERCIAL

## 2025-07-29 VITALS
BODY MASS INDEX: 28.72 KG/M2 | DIASTOLIC BLOOD PRESSURE: 60 MMHG | WEIGHT: 183 LBS | HEIGHT: 67 IN | OXYGEN SATURATION: 98 % | HEART RATE: 66 BPM | SYSTOLIC BLOOD PRESSURE: 150 MMHG | TEMPERATURE: 97.8 F

## 2025-07-29 DIAGNOSIS — I10 PRIMARY HYPERTENSION: Primary | ICD-10-CM

## 2025-07-29 DIAGNOSIS — I44.1 SECOND DEGREE AV BLOCK, MOBITZ TYPE I: ICD-10-CM

## 2025-07-29 DIAGNOSIS — Z00.00 MEDICARE ANNUAL WELLNESS VISIT, SUBSEQUENT: ICD-10-CM

## 2025-07-29 DIAGNOSIS — N18.32 STAGE 3B CHRONIC KIDNEY DISEASE (HCC): ICD-10-CM

## 2025-07-29 PROCEDURE — G0439 PPPS, SUBSEQ VISIT: HCPCS | Performed by: PHYSICIAN ASSISTANT

## 2025-07-29 PROCEDURE — 99214 OFFICE O/P EST MOD 30 MIN: CPT | Performed by: PHYSICIAN ASSISTANT

## 2025-07-29 PROCEDURE — G2211 COMPLEX E/M VISIT ADD ON: HCPCS | Performed by: PHYSICIAN ASSISTANT

## 2025-07-29 RX ORDER — BIMATOPROST 0.1 MG/ML
1 SOLUTION/ DROPS OPHTHALMIC DAILY
COMMUNITY
Start: 2025-05-12

## 2025-08-18 ENCOUNTER — APPOINTMENT (OUTPATIENT)
Dept: RADIOLOGY | Facility: CLINIC | Age: 85
End: 2025-08-18
Payer: COMMERCIAL

## 2025-08-18 ENCOUNTER — APPOINTMENT (OUTPATIENT)
Dept: LAB | Facility: CLINIC | Age: 85
End: 2025-08-18
Payer: COMMERCIAL

## 2025-08-18 DIAGNOSIS — R09.82 PND (POST-NASAL DRIP): ICD-10-CM

## 2025-08-18 DIAGNOSIS — J34.2 DEVIATED SEPTUM: ICD-10-CM

## 2025-08-18 DIAGNOSIS — R09.81 CHRONIC NASAL CONGESTION: ICD-10-CM

## 2025-08-18 DIAGNOSIS — N18.32 STAGE 3B CHRONIC KIDNEY DISEASE (HCC): ICD-10-CM

## 2025-08-18 LAB
ALBUMIN SERPL BCG-MCNC: 4.3 G/DL (ref 3.5–5)
ALP SERPL-CCNC: 73 U/L (ref 34–104)
ALT SERPL W P-5'-P-CCNC: 19 U/L (ref 7–52)
ANION GAP SERPL CALCULATED.3IONS-SCNC: 9 MMOL/L (ref 4–13)
AST SERPL W P-5'-P-CCNC: 18 U/L (ref 13–39)
BACTERIA UR QL AUTO: NORMAL /HPF
BASOPHILS # BLD AUTO: 0.06 THOUSANDS/ÂΜL (ref 0–0.1)
BASOPHILS NFR BLD AUTO: 1 % (ref 0–1)
BILIRUB SERPL-MCNC: 0.75 MG/DL (ref 0.2–1)
BILIRUB UR QL STRIP: NEGATIVE
BUN SERPL-MCNC: 38 MG/DL (ref 5–25)
CALCIUM SERPL-MCNC: 9 MG/DL (ref 8.4–10.2)
CHLORIDE SERPL-SCNC: 103 MMOL/L (ref 96–108)
CLARITY UR: CLEAR
CO2 SERPL-SCNC: 25 MMOL/L (ref 21–32)
COLOR UR: COLORLESS
CREAT SERPL-MCNC: 2.16 MG/DL (ref 0.6–1.3)
EOSINOPHIL # BLD AUTO: 0.35 THOUSAND/ÂΜL (ref 0–0.61)
EOSINOPHIL NFR BLD AUTO: 7 % (ref 0–6)
ERYTHROCYTE [DISTWIDTH] IN BLOOD BY AUTOMATED COUNT: 12.9 % (ref 11.6–15.1)
GFR SERPL CREATININE-BSD FRML MDRD: 26 ML/MIN/1.73SQ M
GLUCOSE P FAST SERPL-MCNC: 104 MG/DL (ref 65–99)
GLUCOSE UR STRIP-MCNC: NEGATIVE MG/DL
HCT VFR BLD AUTO: 35.9 % (ref 36.5–49.3)
HGB BLD-MCNC: 11.9 G/DL (ref 12–17)
HGB UR QL STRIP.AUTO: NEGATIVE
IMM GRANULOCYTES # BLD AUTO: 0.01 THOUSAND/UL (ref 0–0.2)
IMM GRANULOCYTES NFR BLD AUTO: 0 % (ref 0–2)
KETONES UR STRIP-MCNC: NEGATIVE MG/DL
LEUKOCYTE ESTERASE UR QL STRIP: NEGATIVE
LYMPHOCYTES # BLD AUTO: 1.32 THOUSANDS/ÂΜL (ref 0.6–4.47)
LYMPHOCYTES NFR BLD AUTO: 25 % (ref 14–44)
MCH RBC QN AUTO: 29.6 PG (ref 26.8–34.3)
MCHC RBC AUTO-ENTMCNC: 33.1 G/DL (ref 31.4–37.4)
MCV RBC AUTO: 89 FL (ref 82–98)
MONOCYTES # BLD AUTO: 0.6 THOUSAND/ÂΜL (ref 0.17–1.22)
MONOCYTES NFR BLD AUTO: 11 % (ref 4–12)
NEUTROPHILS # BLD AUTO: 2.97 THOUSANDS/ÂΜL (ref 1.85–7.62)
NEUTS SEG NFR BLD AUTO: 56 % (ref 43–75)
NITRITE UR QL STRIP: NEGATIVE
NON-SQ EPI CELLS URNS QL MICRO: NORMAL /HPF
NRBC BLD AUTO-RTO: 0 /100 WBCS
PH UR STRIP.AUTO: 6.5 [PH]
PHOSPHATE SERPL-MCNC: 3.4 MG/DL (ref 2.3–4.1)
PLATELET # BLD AUTO: 186 THOUSANDS/UL (ref 149–390)
PMV BLD AUTO: 10.1 FL (ref 8.9–12.7)
POTASSIUM SERPL-SCNC: 4.5 MMOL/L (ref 3.5–5.3)
PROT SERPL-MCNC: 7.3 G/DL (ref 6.4–8.4)
PROT UR STRIP-MCNC: NEGATIVE MG/DL
RBC # BLD AUTO: 4.02 MILLION/UL (ref 3.88–5.62)
RBC #/AREA URNS AUTO: NORMAL /HPF
SODIUM SERPL-SCNC: 137 MMOL/L (ref 135–147)
SP GR UR STRIP.AUTO: 1.01 (ref 1–1.03)
UROBILINOGEN UR STRIP-ACNC: <2 MG/DL
WBC # BLD AUTO: 5.31 THOUSAND/UL (ref 4.31–10.16)
WBC #/AREA URNS AUTO: NORMAL /HPF

## 2025-08-18 PROCEDURE — 82306 VITAMIN D 25 HYDROXY: CPT

## 2025-08-18 PROCEDURE — 36415 COLL VENOUS BLD VENIPUNCTURE: CPT

## 2025-08-18 PROCEDURE — 83970 ASSAY OF PARATHORMONE: CPT

## 2025-08-18 PROCEDURE — 80053 COMPREHEN METABOLIC PANEL: CPT

## 2025-08-18 PROCEDURE — 85025 COMPLETE CBC W/AUTO DIFF WBC: CPT

## 2025-08-18 PROCEDURE — 71046 X-RAY EXAM CHEST 2 VIEWS: CPT

## 2025-08-18 PROCEDURE — 84100 ASSAY OF PHOSPHORUS: CPT

## 2025-08-18 PROCEDURE — 81001 URINALYSIS AUTO W/SCOPE: CPT

## 2025-08-19 DIAGNOSIS — N17.9 AKI (ACUTE KIDNEY INJURY) (HCC): Primary | ICD-10-CM

## 2025-08-19 LAB
25(OH)D3 SERPL-MCNC: 39.6 NG/ML (ref 30–100)
PTH-INTACT SERPL-MCNC: 51.7 PG/ML (ref 12–88)

## 2025-08-19 RX ORDER — FLUTICASONE PROPIONATE 50 MCG
SPRAY, SUSPENSION (ML) NASAL
Qty: 48 ML | Refills: 1 | Status: SHIPPED | OUTPATIENT
Start: 2025-08-19